# Patient Record
Sex: FEMALE | Race: WHITE | Employment: UNEMPLOYED | URBAN - METROPOLITAN AREA
[De-identification: names, ages, dates, MRNs, and addresses within clinical notes are randomized per-mention and may not be internally consistent; named-entity substitution may affect disease eponyms.]

---

## 2017-02-14 ENCOUNTER — HOSPITAL ENCOUNTER (OUTPATIENT)
Dept: PREADMISSION TESTING | Age: 42
Discharge: HOME | End: 2017-02-14
Payer: COMMERCIAL

## 2017-02-14 VITALS
HEART RATE: 80 BPM | WEIGHT: 141.8 LBS | TEMPERATURE: 99.1 F | HEIGHT: 66 IN | BODY MASS INDEX: 22.79 KG/M2 | RESPIRATION RATE: 14 BRPM | SYSTOLIC BLOOD PRESSURE: 115 MMHG | DIASTOLIC BLOOD PRESSURE: 89 MMHG

## 2017-02-14 DIAGNOSIS — J84.10 LUNG FIBROSIS (HCC): Chronic | ICD-10-CM

## 2017-02-14 DIAGNOSIS — B38.9 COCCIDIOMYCOSIS, PROGRESSIVE: Chronic | ICD-10-CM

## 2017-02-14 DIAGNOSIS — K21.9 GASTROESOPHAGEAL REFLUX DISEASE, ESOPHAGITIS PRESENCE NOT SPECIFIED: Chronic | ICD-10-CM

## 2017-02-14 DIAGNOSIS — I73.00 SECONDARY RAYNAUD'S PHENOMENON: Chronic | ICD-10-CM

## 2017-02-14 DIAGNOSIS — M34.9 SCLERODERMA (HCC): ICD-10-CM

## 2017-02-14 PROBLEM — S63.289A DISLOCATION OF FINGER PIP JOINT: Chronic | Status: ACTIVE | Noted: 2017-02-14

## 2017-02-14 LAB
ANION GAP SERPL CALCULATED.3IONS-SCNC: 11 MEQ/L (ref 7–13)
BUN BLDV-MCNC: 7 MG/DL (ref 6–20)
CALCIUM SERPL-MCNC: 9.1 MG/DL (ref 8.6–10.2)
CHLORIDE BLD-SCNC: 105 MEQ/L (ref 98–107)
CO2: 24 MEQ/L (ref 22–29)
CREAT SERPL-MCNC: 0.75 MG/DL (ref 0.5–0.9)
GFR AFRICAN AMERICAN: >60
GFR NON-AFRICAN AMERICAN: >60
GLUCOSE BLD-MCNC: 81 MG/DL (ref 74–109)
HCT VFR BLD CALC: 39.7 % (ref 37–47)
HEMOGLOBIN: 12.9 G/DL (ref 12–16)
MCH RBC QN AUTO: 28.1 PG (ref 27–31.3)
MCHC RBC AUTO-ENTMCNC: 32.4 % (ref 33–37)
MCV RBC AUTO: 86.8 FL (ref 82–100)
PDW BLD-RTO: 14.2 % (ref 11.5–14.5)
PLATELET # BLD: 139 K/UL (ref 130–400)
POTASSIUM SERPL-SCNC: 4.7 MEQ/L (ref 3.5–5.1)
RBC # BLD: 4.58 M/UL (ref 4.2–5.4)
SODIUM BLD-SCNC: 140 MEQ/L (ref 132–144)
WBC # BLD: 4.9 K/UL (ref 4.8–10.8)

## 2017-02-14 PROCEDURE — 85027 COMPLETE CBC AUTOMATED: CPT

## 2017-02-14 PROCEDURE — 80048 BASIC METABOLIC PNL TOTAL CA: CPT

## 2017-02-14 RX ORDER — SODIUM CHLORIDE 0.9 % (FLUSH) 0.9 %
10 SYRINGE (ML) INJECTION EVERY 12 HOURS SCHEDULED
Status: CANCELLED | OUTPATIENT
Start: 2017-02-14

## 2017-02-14 RX ORDER — SODIUM CHLORIDE, SODIUM LACTATE, POTASSIUM CHLORIDE, CALCIUM CHLORIDE 600; 310; 30; 20 MG/100ML; MG/100ML; MG/100ML; MG/100ML
INJECTION, SOLUTION INTRAVENOUS CONTINUOUS
Status: CANCELLED | OUTPATIENT
Start: 2017-02-14

## 2017-02-14 RX ORDER — LIDOCAINE HYDROCHLORIDE 10 MG/ML
1 INJECTION, SOLUTION EPIDURAL; INFILTRATION; INTRACAUDAL; PERINEURAL
Status: CANCELLED | OUTPATIENT
Start: 2017-02-14 | End: 2017-02-14

## 2017-02-14 RX ORDER — FLUCONAZOLE 200 MG/1
200 TABLET ORAL 2 TIMES DAILY
COMMUNITY

## 2017-02-14 RX ORDER — OMEPRAZOLE 40 MG/1
40 CAPSULE, DELAYED RELEASE ORAL 2 TIMES DAILY
COMMUNITY

## 2017-02-14 RX ORDER — SODIUM CHLORIDE 0.9 % (FLUSH) 0.9 %
10 SYRINGE (ML) INJECTION PRN
Status: CANCELLED | OUTPATIENT
Start: 2017-02-14

## 2017-02-14 RX ORDER — MYCOPHENOLATE MOFETIL 500 MG/1
500 TABLET ORAL 2 TIMES DAILY
COMMUNITY

## 2017-02-14 RX ORDER — HYDROXYCHLOROQUINE SULFATE 200 MG/1
400 TABLET, FILM COATED ORAL DAILY
COMMUNITY
Start: 2008-04-09

## 2017-02-14 ASSESSMENT — ENCOUNTER SYMPTOMS
COUGH: 1
BLURRED VISION: 0
CONSTIPATION: 0
DOUBLE VISION: 0
DIARRHEA: 0
SORE THROAT: 0
VOMITING: 0
WHEEZING: 0
EYE PAIN: 0
EYES NEGATIVE: 1
HEARTBURN: 1
SHORTNESS OF BREATH: 0
NAUSEA: 0

## 2017-02-20 ENCOUNTER — ANESTHESIA (OUTPATIENT)
Dept: OPERATING ROOM | Age: 42
End: 2017-02-20
Payer: COMMERCIAL

## 2017-02-20 ENCOUNTER — HOSPITAL ENCOUNTER (OUTPATIENT)
Age: 42
Setting detail: OUTPATIENT SURGERY
Discharge: HOME | End: 2017-02-20
Attending: ORTHOPAEDIC SURGERY | Admitting: ORTHOPAEDIC SURGERY
Payer: COMMERCIAL

## 2017-02-20 ENCOUNTER — HOSPITAL ENCOUNTER (OUTPATIENT)
Dept: GENERAL RADIOLOGY | Age: 42
Setting detail: OUTPATIENT SURGERY
Discharge: HOME | End: 2017-02-20
Attending: ORTHOPAEDIC SURGERY
Payer: COMMERCIAL

## 2017-02-20 ENCOUNTER — ANESTHESIA EVENT (OUTPATIENT)
Dept: OPERATING ROOM | Age: 42
End: 2017-02-20
Payer: COMMERCIAL

## 2017-02-20 VITALS
HEART RATE: 70 BPM | TEMPERATURE: 97.7 F | OXYGEN SATURATION: 99 % | DIASTOLIC BLOOD PRESSURE: 89 MMHG | SYSTOLIC BLOOD PRESSURE: 112 MMHG | RESPIRATION RATE: 16 BRPM

## 2017-02-20 VITALS — DIASTOLIC BLOOD PRESSURE: 57 MMHG | OXYGEN SATURATION: 100 % | SYSTOLIC BLOOD PRESSURE: 90 MMHG

## 2017-02-20 DIAGNOSIS — M79.642 HAND PAIN, LEFT: ICD-10-CM

## 2017-02-20 PROCEDURE — 7100000000 HC PACU RECOVERY - FIRST 15 MIN: Performed by: ORTHOPAEDIC SURGERY

## 2017-02-20 PROCEDURE — 6360000002 HC RX W HCPCS: Performed by: NURSE ANESTHETIST, CERTIFIED REGISTERED

## 2017-02-20 PROCEDURE — 6360000002 HC RX W HCPCS: Performed by: ORTHOPAEDIC SURGERY

## 2017-02-20 PROCEDURE — 2580000003 HC RX 258: Performed by: STUDENT IN AN ORGANIZED HEALTH CARE EDUCATION/TRAINING PROGRAM

## 2017-02-20 PROCEDURE — 2500000003 HC RX 250 WO HCPCS: Performed by: STUDENT IN AN ORGANIZED HEALTH CARE EDUCATION/TRAINING PROGRAM

## 2017-02-20 PROCEDURE — 2720000010 HC SURG SUPPLY STERILE: Performed by: ORTHOPAEDIC SURGERY

## 2017-02-20 PROCEDURE — C1713 ANCHOR/SCREW BN/BN,TIS/BN: HCPCS | Performed by: ORTHOPAEDIC SURGERY

## 2017-02-20 PROCEDURE — 3700000000 HC ANESTHESIA ATTENDED CARE: Performed by: ORTHOPAEDIC SURGERY

## 2017-02-20 PROCEDURE — 3600000014 HC SURGERY LEVEL 4 ADDTL 15MIN: Performed by: ORTHOPAEDIC SURGERY

## 2017-02-20 PROCEDURE — 7100000010 HC PHASE II RECOVERY - FIRST 15 MIN: Performed by: ORTHOPAEDIC SURGERY

## 2017-02-20 PROCEDURE — 3600000004 HC SURGERY LEVEL 4 BASE: Performed by: ORTHOPAEDIC SURGERY

## 2017-02-20 PROCEDURE — 2500000003 HC RX 250 WO HCPCS: Performed by: ORTHOPAEDIC SURGERY

## 2017-02-20 PROCEDURE — 6360000002 HC RX W HCPCS: Performed by: STUDENT IN AN ORGANIZED HEALTH CARE EDUCATION/TRAINING PROGRAM

## 2017-02-20 PROCEDURE — 7100000011 HC PHASE II RECOVERY - ADDTL 15 MIN: Performed by: ORTHOPAEDIC SURGERY

## 2017-02-20 PROCEDURE — 3700000001 HC ADD 15 MINUTES (ANESTHESIA): Performed by: ORTHOPAEDIC SURGERY

## 2017-02-20 PROCEDURE — 7100000001 HC PACU RECOVERY - ADDTL 15 MIN: Performed by: ORTHOPAEDIC SURGERY

## 2017-02-20 PROCEDURE — 2580000003 HC RX 258: Performed by: ORTHOPAEDIC SURGERY

## 2017-02-20 PROCEDURE — 3209999900 FLUORO FOR SURGICAL PROCEDURES

## 2017-02-20 DEVICE — 18.0MM, MICRO ACUTRAK 2® BONE SCREW
Type: IMPLANTABLE DEVICE | Status: FUNCTIONAL
Brand: ACUMED

## 2017-02-20 RX ORDER — SODIUM CHLORIDE, SODIUM LACTATE, POTASSIUM CHLORIDE, CALCIUM CHLORIDE 600; 310; 30; 20 MG/100ML; MG/100ML; MG/100ML; MG/100ML
INJECTION, SOLUTION INTRAVENOUS CONTINUOUS PRN
Status: DISCONTINUED | OUTPATIENT
Start: 2017-02-20 | End: 2017-02-20 | Stop reason: SDUPTHER

## 2017-02-20 RX ORDER — ONDANSETRON 2 MG/ML
4 INJECTION INTRAMUSCULAR; INTRAVENOUS
Status: DISCONTINUED | OUTPATIENT
Start: 2017-02-20 | End: 2017-02-20 | Stop reason: HOSPADM

## 2017-02-20 RX ORDER — MEPERIDINE HYDROCHLORIDE 25 MG/ML
12.5 INJECTION INTRAMUSCULAR; INTRAVENOUS; SUBCUTANEOUS EVERY 5 MIN PRN
Status: DISCONTINUED | OUTPATIENT
Start: 2017-02-20 | End: 2017-02-20 | Stop reason: HOSPADM

## 2017-02-20 RX ORDER — MIDAZOLAM HYDROCHLORIDE 1 MG/ML
INJECTION INTRAMUSCULAR; INTRAVENOUS PRN
Status: DISCONTINUED | OUTPATIENT
Start: 2017-02-20 | End: 2017-02-20 | Stop reason: SDUPTHER

## 2017-02-20 RX ORDER — MAGNESIUM HYDROXIDE 1200 MG/15ML
LIQUID ORAL CONTINUOUS PRN
Status: DISCONTINUED | OUTPATIENT
Start: 2017-02-20 | End: 2017-02-20 | Stop reason: HOSPADM

## 2017-02-20 RX ORDER — DIPHENHYDRAMINE HYDROCHLORIDE 50 MG/ML
12.5 INJECTION INTRAMUSCULAR; INTRAVENOUS
Status: DISCONTINUED | OUTPATIENT
Start: 2017-02-20 | End: 2017-02-20 | Stop reason: HOSPADM

## 2017-02-20 RX ORDER — HYDROCODONE BITARTRATE AND ACETAMINOPHEN 5; 325 MG/1; MG/1
2 TABLET ORAL PRN
Status: DISCONTINUED | OUTPATIENT
Start: 2017-02-20 | End: 2017-02-20 | Stop reason: HOSPADM

## 2017-02-20 RX ORDER — BUPIVACAINE HYDROCHLORIDE 5 MG/ML
INJECTION, SOLUTION EPIDURAL; INTRACAUDAL PRN
Status: DISCONTINUED | OUTPATIENT
Start: 2017-02-20 | End: 2017-02-20 | Stop reason: HOSPADM

## 2017-02-20 RX ORDER — OXYCODONE HYDROCHLORIDE AND ACETAMINOPHEN 5; 325 MG/1; MG/1
1 TABLET ORAL EVERY 4 HOURS PRN
Qty: 40 TABLET | Refills: 0 | Status: SHIPPED | OUTPATIENT
Start: 2017-02-20 | End: 2017-02-27

## 2017-02-20 RX ORDER — FENTANYL CITRATE 50 UG/ML
50 INJECTION, SOLUTION INTRAMUSCULAR; INTRAVENOUS EVERY 10 MIN PRN
Status: DISCONTINUED | OUTPATIENT
Start: 2017-02-20 | End: 2017-02-20 | Stop reason: HOSPADM

## 2017-02-20 RX ORDER — FENTANYL CITRATE 50 UG/ML
INJECTION, SOLUTION INTRAMUSCULAR; INTRAVENOUS PRN
Status: DISCONTINUED | OUTPATIENT
Start: 2017-02-20 | End: 2017-02-20 | Stop reason: SDUPTHER

## 2017-02-20 RX ORDER — HYDROCODONE BITARTRATE AND ACETAMINOPHEN 5; 325 MG/1; MG/1
1 TABLET ORAL PRN
Status: DISCONTINUED | OUTPATIENT
Start: 2017-02-20 | End: 2017-02-20 | Stop reason: HOSPADM

## 2017-02-20 RX ORDER — METOCLOPRAMIDE HYDROCHLORIDE 5 MG/ML
10 INJECTION INTRAMUSCULAR; INTRAVENOUS
Status: DISCONTINUED | OUTPATIENT
Start: 2017-02-20 | End: 2017-02-20 | Stop reason: HOSPADM

## 2017-02-20 RX ORDER — PROPOFOL 10 MG/ML
INJECTION, EMULSION INTRAVENOUS PRN
Status: DISCONTINUED | OUTPATIENT
Start: 2017-02-20 | End: 2017-02-20 | Stop reason: SDUPTHER

## 2017-02-20 RX ORDER — ONDANSETRON 2 MG/ML
INJECTION INTRAMUSCULAR; INTRAVENOUS PRN
Status: DISCONTINUED | OUTPATIENT
Start: 2017-02-20 | End: 2017-02-20 | Stop reason: SDUPTHER

## 2017-02-20 RX ORDER — SODIUM CHLORIDE, SODIUM LACTATE, POTASSIUM CHLORIDE, CALCIUM CHLORIDE 600; 310; 30; 20 MG/100ML; MG/100ML; MG/100ML; MG/100ML
INJECTION, SOLUTION INTRAVENOUS CONTINUOUS
Status: DISCONTINUED | OUTPATIENT
Start: 2017-02-20 | End: 2017-02-20 | Stop reason: HOSPADM

## 2017-02-20 RX ORDER — LIDOCAINE HYDROCHLORIDE 10 MG/ML
1 INJECTION, SOLUTION EPIDURAL; INFILTRATION; INTRACAUDAL; PERINEURAL
Status: COMPLETED | OUTPATIENT
Start: 2017-02-20 | End: 2017-02-20

## 2017-02-20 RX ADMIN — MIDAZOLAM HYDROCHLORIDE 2 MG: 1 INJECTION, SOLUTION INTRAMUSCULAR; INTRAVENOUS at 12:45

## 2017-02-20 RX ADMIN — LIDOCAINE HYDROCHLORIDE 0.1 ML: 10 INJECTION, SOLUTION EPIDURAL; INFILTRATION; INTRACAUDAL; PERINEURAL at 10:46

## 2017-02-20 RX ADMIN — PROPOFOL 160 MG: 10 INJECTION, EMULSION INTRAVENOUS at 12:55

## 2017-02-20 RX ADMIN — SODIUM CHLORIDE, POTASSIUM CHLORIDE, SODIUM LACTATE AND CALCIUM CHLORIDE: 600; 310; 30; 20 INJECTION, SOLUTION INTRAVENOUS at 10:46

## 2017-02-20 RX ADMIN — FENTANYL CITRATE 50 MCG: 50 INJECTION, SOLUTION INTRAMUSCULAR; INTRAVENOUS at 13:15

## 2017-02-20 RX ADMIN — FENTANYL CITRATE 50 MCG: 50 INJECTION, SOLUTION INTRAMUSCULAR; INTRAVENOUS at 13:35

## 2017-02-20 RX ADMIN — Medication 2 G: at 12:56

## 2017-02-20 RX ADMIN — SODIUM CHLORIDE, POTASSIUM CHLORIDE, SODIUM LACTATE AND CALCIUM CHLORIDE: 600; 310; 30; 20 INJECTION, SOLUTION INTRAVENOUS at 12:42

## 2017-02-20 RX ADMIN — ONDANSETRON 4 MG: 2 INJECTION, SOLUTION INTRAMUSCULAR; INTRAVENOUS at 13:39

## 2017-02-20 ASSESSMENT — PAIN - FUNCTIONAL ASSESSMENT: PAIN_FUNCTIONAL_ASSESSMENT: 0-10

## 2017-02-20 ASSESSMENT — PAIN SCALES - GENERAL: PAINLEVEL_OUTOF10: 0

## 2017-03-09 ENCOUNTER — HOSPITAL ENCOUNTER (OUTPATIENT)
Dept: GENERAL RADIOLOGY | Age: 42
Discharge: HOME | End: 2017-03-09
Payer: COMMERCIAL

## 2017-03-09 DIAGNOSIS — M79.645 PAIN OF FINGER OF LEFT HAND: ICD-10-CM

## 2017-03-09 PROCEDURE — 73140 X-RAY EXAM OF FINGER(S): CPT

## 2024-11-26 ENCOUNTER — APPOINTMENT (OUTPATIENT)
Dept: RHEUMATOLOGY | Facility: CLINIC | Age: 49
End: 2024-11-26
Payer: COMMERCIAL

## 2024-11-26 VITALS
DIASTOLIC BLOOD PRESSURE: 83 MMHG | BODY MASS INDEX: 23.82 KG/M2 | HEIGHT: 65 IN | SYSTOLIC BLOOD PRESSURE: 144 MMHG | HEART RATE: 90 BPM | WEIGHT: 143 LBS

## 2024-11-26 DIAGNOSIS — J84.9 ILD (INTERSTITIAL LUNG DISEASE) (MULTI): ICD-10-CM

## 2024-11-26 DIAGNOSIS — M34.9 SYSTEMIC SCLEROSIS (MULTI): ICD-10-CM

## 2024-11-26 DIAGNOSIS — M35.1 MCTD (MIXED CONNECTIVE TISSUE DISEASE) (MULTI): Primary | ICD-10-CM

## 2024-11-26 DIAGNOSIS — I73.00 RAYNAUD'S PHENOMENON WITHOUT GANGRENE: ICD-10-CM

## 2024-11-26 PROCEDURE — 3008F BODY MASS INDEX DOCD: CPT | Performed by: INTERNAL MEDICINE

## 2024-11-26 PROCEDURE — 1036F TOBACCO NON-USER: CPT | Performed by: INTERNAL MEDICINE

## 2024-11-26 PROCEDURE — 99205 OFFICE O/P NEW HI 60 MIN: CPT | Performed by: INTERNAL MEDICINE

## 2024-11-26 RX ORDER — OMEPRAZOLE 40 MG/1
40 CAPSULE, DELAYED RELEASE ORAL 2 TIMES DAILY
COMMUNITY
Start: 2024-03-13

## 2024-11-26 RX ORDER — MYCOPHENOLATE MOFETIL 500 MG/1
1000 TABLET ORAL 2 TIMES DAILY
Qty: 240 TABLET | Refills: 2 | Status: SHIPPED | OUTPATIENT
Start: 2024-11-26

## 2024-11-26 RX ORDER — BISMUTH SUBSALICYLATE 262 MG
TABLET,CHEWABLE ORAL
COMMUNITY

## 2024-11-26 RX ORDER — SILDENAFIL CITRATE 20 MG/1
20 TABLET ORAL 2 TIMES DAILY
COMMUNITY
Start: 2024-10-02 | End: 2024-11-26 | Stop reason: SDUPTHER

## 2024-11-26 RX ORDER — FERROUS SULFATE 325(65) MG
325 TABLET ORAL 3 TIMES WEEKLY
COMMUNITY
Start: 2024-06-14

## 2024-11-26 RX ORDER — SULFAMETHOXAZOLE AND TRIMETHOPRIM 800; 160 MG/1; MG/1
1 TABLET ORAL 3 TIMES WEEKLY
COMMUNITY
Start: 2018-07-11

## 2024-11-26 RX ORDER — SILDENAFIL CITRATE 20 MG/1
20 TABLET ORAL 3 TIMES DAILY
Qty: 180 TABLET | Refills: 2 | Status: SHIPPED | OUTPATIENT
Start: 2024-11-26

## 2024-11-26 RX ORDER — HYDROXYCHLOROQUINE SULFATE 200 MG/1
300 TABLET, FILM COATED ORAL DAILY
Qty: 90 TABLET | Refills: 1 | Status: SHIPPED | OUTPATIENT
Start: 2024-11-26

## 2024-11-26 RX ORDER — MYCOPHENOLATE MOFETIL 500 MG/1
TABLET ORAL
COMMUNITY
Start: 2013-06-01 | End: 2024-11-26 | Stop reason: SDUPTHER

## 2024-11-26 RX ORDER — BIOTIN 1 MG
1 TABLET ORAL DAILY
COMMUNITY

## 2024-11-26 RX ORDER — DICLOFENAC SODIUM 10 MG/G
GEL TOPICAL
COMMUNITY
Start: 2024-11-05

## 2024-11-26 RX ORDER — HYDROXYCHLOROQUINE SULFATE 200 MG/1
TABLET, FILM COATED ORAL
COMMUNITY
Start: 2017-05-24 | End: 2024-11-26 | Stop reason: SDUPTHER

## 2024-11-26 RX ORDER — AMOXICILLIN 500 MG
CAPSULE ORAL
COMMUNITY

## 2024-11-26 RX ORDER — FLUCONAZOLE 200 MG/1
200 TABLET ORAL 2 TIMES DAILY
COMMUNITY

## 2024-11-26 ASSESSMENT — PAIN SCALES - GENERAL: PAINLEVEL_OUTOF10: 3

## 2024-11-26 NOTE — PROGRESS NOTES
"  49 y.o.        female    self-referred        CHIEF COMPLAINT: Management of MCTD/sclerderma    HPI: Reviewed the old chart from [facility].  Pt is a 33 yo self referred  woman w/ pmh of previously diagnosed mixed connective tissue disease (dx in 2006.  Onset of symptoms in winter of 2005 when she developed Raynaud's. She had cholecystectomy in April 2006 and the following month (May 2006) she developed fevers (max temp 101), rash (chest and arms), arthritis (swelling of hands, knees, ankles)- although she went to ED, no diagnosis or tx given. A month later, her fevers and rash had resolved but she continued to have hand swelling and pain. An KATIE done by her PCP was positive. She was subsequently referred to Dr. Reagan who diagnosed her with MCTD. She was started on prednisone and plaquenil and nifedipine. Methotrexate was later initiated due to continued arthritis. Has only tried aspirin but no other NSAIDs. Has been on chronic prednisone (lowest dose 5 mg daily, has not tried to go lower on dose).   Seen Rheumatology in [place] and in [place]. Last saw Rheumatology in June 2018. \"[place] 2012-13- coccidioides/Valley Fever & therefore;MMF 1000 mg PO BID was held while undergoing fluconazole.  2016 restarted MMF but has never gotten back to full dose.   2016 - June 2019  mg PO TID   2019 Jan - June off Rx due to insurance.     Current sz: Severe Raynaud phenomenon (dusky digits) helped somewhat by amlopdipine only on 5mg. Prior meds gave her migraines/light headed.  2018 - Had echo, chest CT, and PFTs at  per Dr. Yusuf.  2012 [facility]-> \"borderline\" echo findings for PH. The [facility] she was told was normal.   She notes GERD, currently on Prilosec 40mg BID with control \"most of the time\". Still has symptoms 4-5 times per month. She does sleep with HOB elevated.   She was started on Actemra infusion in 2022     At today's visit, the patient reports starting Revatio in 2024 which is helping " her Raynaud's. She is taking Cellcept 1 gm BID and is tolerating it well.  Also, taking Actemra infusions. No morning stiffness. Has pain in knees, and elbows occasionally.   Has no difficulty swallowing but sometimes food gets stuck. Has GERD. Has diarrhea wirh constipation.  Has mild SOB.         Past Medical History:   Diagnosis Date    Chondrocostal junction syndrome (tietze) 11/28/2017    Costochondritis    Other acute postprocedural pain 03/01/2017    Post-operative pain    Other chest pain 11/01/2017    Chest wall tenderness            Past Surgical History:   Procedure Laterality Date    CHOLECYSTECTOMY  02/21/2017    Cholecystectomy    LYMPH NODE BIOPSY  05/24/2017    Biopsy Lymph Node    OTHER SURGICAL HISTORY  05/24/2017    Total Abdominal Hysterectomy With Removal Of Both Tubes    TUBAL LIGATION  02/21/2017    Tubal Ligation       No Known Allergies    Medication Documentation Review Audit       Reviewed by Kareen Costello MA (Medical Assistant) on 11/26/24 at 0906      Medication Order Taking? Sig Documenting Provider Last Dose Status   biotin 1 mg tablet [id number] Yes Take 1 tablet (1 mg) by mouth once daily. Historical Provider, MD  Active   diclofenac sodium (Voltaren) 1 % gel [id number] Yes  Historical Provider, MD  Active   ferrous sulfate, 325 mg ferrous sulfate, tablet [id number] Yes Take 1 tablet (325 mg) by mouth 3 times a week. Historical Provider, MD  Active   fluconazole (Diflucan) 200 mg tablet [id number] Yes Take 1 tablet (200 mg) by mouth 2 times a day. Historical Provider, MD  Active   hydroxychloroquine (Plaquenil) 200 mg tablet [id number] Yes Take 200mg Monday, Wednesday, Friday, Sunday and 400mg Tuesday, Thursday, Saturday, . Historical Provider, MD  Active   L. acidophilus/Bifid. animalis 32 billion cell capsule [id number] Yes Take by mouth. Historical Provider, MD  Active   multivitamin tablet [id number] Yes Take by mouth once daily. Historical Provider, MD  Active   mycophenolate  (Cellcept) 500 mg tablet [id number] Yes  Historical Provider, MD  Active   omega-3 fatty acids-fish oil 300-1,000 mg capsule [id number] Yes Take by mouth once daily. Historical Provider, MD  Active   omeprazole (PriLOSEC) 40 mg DR capsule [id number] Yes Take 1 capsule (40 mg) by mouth twice a day. Historical Provider, MD  Active   sildenafil (Revatio) 20 mg tablet [id number] Yes Take 1 tablet (20 mg) by mouth twice a day. Historical Provider, MD  Active   sulfamethoxazole-trimethoprim (Bactrim DS) 800-160 mg tablet [id number] Yes Take 1 tablet by mouth 3 times a week. Historical Provider, MD  Active   tocilizumab (Acetemra) IV in 50 mL NS [id number] Yes Infuse into a venous catheter. Historical Provider, MD  Active                        No family history on file.       Social History     Tobacco Use    Smoking status: Never     Passive exposure: Never    Smokeless tobacco: Never   Substance Use Topics    Alcohol use: Yes     Comment: 1 nx a year         Review of Systems    REVIEW OF SYSTEMS:  Constitutional: Has fatigue  Skin:  No rash or psoriasis or photosensitvity  Head: No headache, oral ulcers or hair loss  Neck: No difficulty swallowing or choking  Eyes: Has dry eyes but no iritis  Mouth: Has dry mouth but no mouth sores oral ulcers  Pulmonary: No wheezing, pleurisy but SOB  Cardiovascular: Occasional chest pain or palpitations  Gastrointestinal: As H/P  Endocrine:  Raynaud's +  Musculoskeletal: As H/P          PHYSICAL EXAM:  Visit Vitals  /83   Pulse 90     General - NAD, sitting up in chair, well-groomed, pleasant, AAOx3  Head: Normocephalic, atraumatic  Eyes - PERRLA, EOMI. No conjunctiva injection.   Mouth/ENT - Mouth breadth is 3 fingers. Moist oral and nasal mucosa. No facial rash. No enlarged parotid or submandibular gland. Adequate salivary pooling.  Cardiovascular - Normal S1, S2. Regular rate and rhythm. No murmurs or rubs.  Lungs - Basal rales  Skin - No rashes or ulcers. Skin warm and dry. No  erythema on bilateral cheeks.  Abdomen - Soft, non-tender. No masses. Normal bowel sounds.  Extremities - No edema, cyanosis ,or clubbing  Neurological - Alert and oriented x 3,  grossly intact. No focal deficit.  Musculoskeletal -  EXAMJOINTDETAILED,  Shoulders: Full ROM, without pain, no swelling, warmth or tenderness.  Elbows: Full ROM, without pain, no swelling, warmth or tenderness.  Wrists: Full ROM, without pain, no swelling, warmth or tenderness.  MCP: No swelling, warmth or tenderness. Metacarpal squeeze negative  PIP: No swelling, warmth or tenderness.  DIP: No swelling, warmth or tenderness.  Hands : 5/5.    Knees: No swelling.     +ve KATIE, RNP,u1-snrp a, 70kd, chromatin, Sm/RNP,      Progression of pulmonary fibrosis with bilateral lower lobe basilar segmental honeycombing now present.   2. Mild air trapping.  No bronchial wall thickening or airway occlusion.   3. Stable right lower lobe circumscribed pulmonary nodules measuring up to 0.5 x 0.7 cm.  These findings may represent small intrapulmonary lymph nodes.   4. Marked dilatation of the esophagus containing fluid and solid debris consistent with achalasia in association with known scleroderma.  No esophageal mass or esophageal wall thickening.  The patient is at risk for aspiration.   5. No thoracic adenopathy.   VKR/hff       Assessment/plan: 49 yr old with MCTD/scleroderma. She has ILD and last CT scan is showing worsening fibrosis. Will discontinue Actemra and add Nintedanib. Start on 100 mg BID.  Will increase Sildenafil to TID.   Insert SmartText      Use Biotin.     Labs ordered to assess toxicity.           Caitlin Banuelos MD

## 2024-12-03 ENCOUNTER — TELEPHONE (OUTPATIENT)
Dept: RHEUMATOLOGY | Facility: CLINIC | Age: 49
End: 2024-12-03

## 2024-12-03 ENCOUNTER — LAB (OUTPATIENT)
Dept: LAB | Facility: LAB | Age: 49
End: 2024-12-03
Payer: COMMERCIAL

## 2024-12-03 DIAGNOSIS — M34.9 SYSTEMIC SCLEROSIS (MULTI): ICD-10-CM

## 2024-12-03 DIAGNOSIS — J84.9 ILD (INTERSTITIAL LUNG DISEASE) (MULTI): ICD-10-CM

## 2024-12-03 DIAGNOSIS — M35.1 MCTD (MIXED CONNECTIVE TISSUE DISEASE) (MULTI): ICD-10-CM

## 2024-12-03 LAB
ALBUMIN SERPL BCP-MCNC: 4.2 G/DL (ref 3.4–5)
ALP SERPL-CCNC: 36 U/L (ref 33–110)
ALT SERPL W P-5'-P-CCNC: 7 U/L (ref 7–45)
ANION GAP SERPL CALC-SCNC: 10 MMOL/L (ref 10–20)
AST SERPL W P-5'-P-CCNC: 12 U/L (ref 9–39)
BASOPHILS # BLD AUTO: 0.02 X10*3/UL (ref 0–0.1)
BASOPHILS NFR BLD AUTO: 0.3 %
BILIRUB SERPL-MCNC: 0.5 MG/DL (ref 0–1.2)
BUN SERPL-MCNC: 13 MG/DL (ref 6–23)
CALCIUM SERPL-MCNC: 8.8 MG/DL (ref 8.6–10.3)
CHLORIDE SERPL-SCNC: 106 MMOL/L (ref 98–107)
CK SERPL-CCNC: 50 U/L (ref 0–215)
CO2 SERPL-SCNC: 26 MMOL/L (ref 21–32)
CREAT SERPL-MCNC: 0.76 MG/DL (ref 0.5–1.05)
EGFRCR SERPLBLD CKD-EPI 2021: >90 ML/MIN/1.73M*2
EOSINOPHIL # BLD AUTO: 0.06 X10*3/UL (ref 0–0.7)
EOSINOPHIL NFR BLD AUTO: 0.9 %
ERYTHROCYTE [DISTWIDTH] IN BLOOD BY AUTOMATED COUNT: 13.1 % (ref 11.5–14.5)
GLUCOSE SERPL-MCNC: 81 MG/DL (ref 74–99)
HCT VFR BLD AUTO: 43.2 % (ref 36–46)
HGB BLD-MCNC: 13.2 G/DL (ref 12–16)
IMM GRANULOCYTES # BLD AUTO: 0.02 X10*3/UL (ref 0–0.7)
IMM GRANULOCYTES NFR BLD AUTO: 0.3 % (ref 0–0.9)
LYMPHOCYTES # BLD AUTO: 1.97 X10*3/UL (ref 1.2–4.8)
LYMPHOCYTES NFR BLD AUTO: 30.4 %
MCH RBC QN AUTO: 29.1 PG (ref 26–34)
MCHC RBC AUTO-ENTMCNC: 30.6 G/DL (ref 32–36)
MCV RBC AUTO: 95 FL (ref 80–100)
MONOCYTES # BLD AUTO: 0.58 X10*3/UL (ref 0.1–1)
MONOCYTES NFR BLD AUTO: 9 %
NEUTROPHILS # BLD AUTO: 3.82 X10*3/UL (ref 1.2–7.7)
NEUTROPHILS NFR BLD AUTO: 59.1 %
NRBC BLD-RTO: 0 /100 WBCS (ref 0–0)
PLATELET # BLD AUTO: 201 X10*3/UL (ref 150–450)
POTASSIUM SERPL-SCNC: 4.1 MMOL/L (ref 3.5–5.3)
PROT SERPL-MCNC: 6.8 G/DL (ref 6.4–8.2)
RBC # BLD AUTO: 4.54 X10*6/UL (ref 4–5.2)
SODIUM SERPL-SCNC: 138 MMOL/L (ref 136–145)
WBC # BLD AUTO: 6.5 X10*3/UL (ref 4.4–11.3)

## 2024-12-03 PROCEDURE — 82550 ASSAY OF CK (CPK): CPT

## 2024-12-03 PROCEDURE — 85025 COMPLETE CBC W/AUTO DIFF WBC: CPT

## 2024-12-03 PROCEDURE — 36415 COLL VENOUS BLD VENIPUNCTURE: CPT

## 2024-12-03 PROCEDURE — 80053 COMPREHEN METABOLIC PANEL: CPT

## 2024-12-03 PROCEDURE — 84165 PROTEIN E-PHORESIS SERUM: CPT

## 2024-12-03 PROCEDURE — 86160 COMPLEMENT ANTIGEN: CPT

## 2024-12-03 PROCEDURE — 84165 PROTEIN E-PHORESIS SERUM: CPT | Performed by: INTERNAL MEDICINE

## 2024-12-03 PROCEDURE — 86225 DNA ANTIBODY NATIVE: CPT

## 2024-12-03 PROCEDURE — 84155 ASSAY OF PROTEIN SERUM: CPT

## 2024-12-03 NOTE — TELEPHONE ENCOUNTER
Pl[facility] call to [facility] to clarify hydroxychloroquine dose after verifying with Dr. Banuelos. Spoke with Maria Luisa the pharmacy tech, she states that prescription was cancelled prior to me calling back to verify information. Call was transferred to Phong the pharmacist. Verbal order placed with [facility] for the 300 mg tablets. [facility] states that they will send request for reorder when patient is due for refill due to 300 mg tablet not being in our formulary.

## 2024-12-03 NOTE — TELEPHONE ENCOUNTER
Placed call to and spoke to Elena at [facility] regarding a fax received from them to get clarification on hydroxychloroquine tablets that were ordered for patient. Elena states that there is a 300 mg tablet available instead of having the patient cut a tablet in half. Informed Elena that Dr. Banuelos will be made aware of the 300 mg dose tablet available to see if she wants to reorder.

## 2024-12-04 LAB
ALBUMIN: 4.1 G/DL (ref 3.4–5)
ALPHA 1 GLOBULIN: 0.3 G/DL (ref 0.2–0.6)
ALPHA 2 GLOBULIN: 0.7 G/DL (ref 0.4–1.1)
BETA GLOBULIN: 0.9 G/DL (ref 0.5–1.2)
C3 SERPL-MCNC: 114 MG/DL (ref 87–200)
C4 SERPL-MCNC: 24 MG/DL (ref 10–50)
DSDNA AB SER-ACNC: <1 IU/ML
GAMMA GLOBULIN: 1.2 G/DL (ref 0.5–1.4)
PATH REVIEW-SERUM PROTEIN ELECTROPHORESIS: NORMAL
PROT SERPL-MCNC: 7.1 G/DL (ref 6.4–8.2)
PROTEIN ELECTROPHORESIS COMMENT: NORMAL

## 2024-12-04 RX ORDER — NINTEDANIB 100 MG/1
100 CAPSULE ORAL 2 TIMES DAILY
Qty: 60 CAPSULE | Refills: 2 | Status: SHIPPED | OUTPATIENT
Start: 2024-12-04 | End: 2024-12-13 | Stop reason: SDUPTHER

## 2024-12-05 ENCOUNTER — SPECIALTY PHARMACY (OUTPATIENT)
Dept: PHARMACY | Facility: CLINIC | Age: 49
End: 2024-12-05

## 2024-12-09 ENCOUNTER — SPECIALTY PHARMACY (OUTPATIENT)
Dept: PHARMACY | Facility: CLINIC | Age: 49
End: 2024-12-09

## 2024-12-11 ENCOUNTER — SPECIALTY PHARMACY (OUTPATIENT)
Dept: PHARMACY | Facility: CLINIC | Age: 49
End: 2024-12-11

## 2024-12-13 ENCOUNTER — TELEPHONE (OUTPATIENT)
Dept: RHEUMATOLOGY | Facility: CLINIC | Age: 49
End: 2024-12-13
Payer: COMMERCIAL

## 2024-12-13 DIAGNOSIS — J84.9 ILD (INTERSTITIAL LUNG DISEASE) (MULTI): Primary | ICD-10-CM

## 2024-12-13 DIAGNOSIS — M34.9 SYSTEMIC SCLEROSIS (MULTI): ICD-10-CM

## 2024-12-13 DIAGNOSIS — J84.9 ILD (INTERSTITIAL LUNG DISEASE) (MULTI): ICD-10-CM

## 2024-12-13 RX ORDER — NINTEDANIB 100 MG/1
100 CAPSULE ORAL 2 TIMES DAILY
Qty: 60 CAPSULE | Refills: 2 | Status: SHIPPED | OUTPATIENT
Start: 2024-12-13

## 2024-12-13 RX ORDER — NINTEDANIB 100 MG/1
100 CAPSULE ORAL 2 TIMES DAILY
Qty: 60 CAPSULE | Refills: 2 | Status: SHIPPED | OUTPATIENT
Start: 2024-12-13 | End: 2024-12-13 | Stop reason: SDUPTHER

## 2025-01-23 ENCOUNTER — TELEPHONE (OUTPATIENT)
Dept: RHEUMATOLOGY | Facility: CLINIC | Age: 50
End: 2025-01-23
Payer: COMMERCIAL

## 2025-01-23 NOTE — TELEPHONE ENCOUNTER
Placed call to and spoke with Zuleyma, pharmacist at [facility]. Zuleyma made aware that per Dr. Banuelos's 11/26/2024 progress note, the Actemra was discontinued. Zuleyma verbalized understanding.

## 2025-01-23 NOTE — TELEPHONE ENCOUNTER
Zuleyma, pharmacist at [facility], has questions regarding current status of medication- Actemra infusions.    [phone]

## 2025-01-27 ENCOUNTER — APPOINTMENT (OUTPATIENT)
Dept: CARDIOLOGY | Facility: CLINIC | Age: 50
End: 2025-01-27
Payer: COMMERCIAL

## 2025-01-27 VITALS
DIASTOLIC BLOOD PRESSURE: 92 MMHG | HEIGHT: 65 IN | SYSTOLIC BLOOD PRESSURE: 134 MMHG | BODY MASS INDEX: 23.82 KG/M2 | WEIGHT: 143 LBS | HEART RATE: 79 BPM

## 2025-01-27 DIAGNOSIS — R94.31 ABNORMAL EKG: ICD-10-CM

## 2025-01-27 DIAGNOSIS — R07.9 CHEST PAIN, UNSPECIFIED TYPE: ICD-10-CM

## 2025-01-27 DIAGNOSIS — Z82.49 FAMILY HISTORY OF CORONARY ARTERY DISEASE: ICD-10-CM

## 2025-01-27 DIAGNOSIS — R00.2 PALPITATIONS: ICD-10-CM

## 2025-01-27 DIAGNOSIS — J84.10 PULMONARY FIBROSIS (MULTI): Primary | ICD-10-CM

## 2025-01-27 DIAGNOSIS — M35.1 MIXED CONNECTIVE TISSUE DISEASE (MULTI): ICD-10-CM

## 2025-01-27 PROCEDURE — 99204 OFFICE O/P NEW MOD 45 MIN: CPT | Performed by: INTERNAL MEDICINE

## 2025-01-27 PROCEDURE — 93000 ELECTROCARDIOGRAM COMPLETE: CPT | Performed by: INTERNAL MEDICINE

## 2025-01-27 PROCEDURE — 1036F TOBACCO NON-USER: CPT | Performed by: INTERNAL MEDICINE

## 2025-01-27 PROCEDURE — 3008F BODY MASS INDEX DOCD: CPT | Performed by: INTERNAL MEDICINE

## 2025-01-27 NOTE — PROGRESS NOTES
"Referred by  No ref. provider found for Please see below.     History Of Present Illness:    Cyndi Newton is a 49 y.o. female presenting with scleroderma, lupus., chest pain    I am seeing this 49-year-old hyperlipidemic woman with a history of mixed connective tissue disease and scleroderma for evaluation of chest discomfort.    The patient follows with Dr. Banuelos of rheumatology.  She has mixed connective tissue disease and scleroderma with a history of pulmonary fibrosis with interstitial lung disease.  She follows with pulmonary medicine for this.  She is on Nasreen oh.  She had a right heart catheterization in 2012 according to available records which was normal.  A cardiac MRI in May 2022 disclosed an ejection fraction of 55% with no myocardial inflammation, fibrosis, scarring, or shunt.  There was normal right ventricular size and function.  In March 2022 she had a Holter monitor study that disclosed an \"short atrial run\", and no atrial fibrillation.    She report she has had episodic chest discomfort on and off for years., dating back to 2018.  The symptoms occur a couple of times a year.  The pain is in her mid chest where she points.  The pain is not constant, and sometimes is \"almost like a shock feeling\". The usual duration of pain is about 25 minutes.  Most often, this feeling occurs while she is sleeping.  It has awakened her from sleep.  There is no radiation into the neck, jaw, arms, or elsewhere.  She has a baseline level of dyspnea associated with her pulmonary fibrosis, and during episodes of chest pain, her dyspnea is not worse than baseline.  There is no pleuritic component to the symptoms.  She reports episodic palpitations that occur about once a month and do not accompany her chest pain.  The palpitations are brief and resolve on their own.  She exercises twice a week doing a video workout on or her phone.  The patient gardening, yard work etc.    She has a separate, second kind of " pain that is dull and achy and relatively constant.  She has been told this is due to costochondritis.    First: 25 minutes sharper: a couple of weeks ago.  Occurred while sleepping.  Awoke her from sleep  did not med attn.  No rad. No SOB over and above normal (IPF).  Has episodic palpitations that occur once a month, not accompanying pain.        Past Medical History:  She has a past medical history of Chondrocostal junction syndrome (tietze) (11/28/2017), Other acute postprocedural pain (03/01/2017), and Other chest pain (11/01/2017).    Past Surgical History:  She has a past surgical history that includes Tubal ligation (02/21/2017); Cholecystectomy (02/21/2017); Other surgical history (05/24/2017); and Lymph node biopsy (05/24/2017).      Social History:  She reports that she has never smoked. She has never been exposed to tobacco smoke. She has never used smokeless tobacco. She reports current alcohol use. She reports that she does not use drugs.    Family History:  Family History   Problem Relation Name Age of Onset    Other (heart issues) Mother      Heart attack Father          early 60's    Other (CABG) Father      Other (CABG) Paternal Grandmother      Heart attack Paternal Grandfather          diet at age 52 MI.        Allergies:  Patient has no known allergies.    Outpatient Medications:  Current Outpatient Medications   Medication Instructions    biotin 1 mg tablet 1 tablet, Daily    diclofenac sodium (Voltaren) 1 % gel     ferrous sulfate (325 mg ferrous sulfate) 325 mg, 3 times weekly    fluconazole (DIFLUCAN) 200 mg, 2 times daily    hydroxychloroquine (PLAQUENIL) 300 mg, oral, Daily    L. acidophilus/Bifid. animalis 32 billion cell capsule Take by mouth.    multivitamin tablet Daily RT    mycophenolate (CELLCEPT) 1,000 mg, oral, 2 times daily    Ofev 100 mg, oral, 2 times daily    omega-3 fatty acids-fish oil 300-1,000 mg capsule Daily RT    omeprazole (PRILOSEC) 40 mg, 2 times daily    sildenafil  "(REVATIO) 20 mg, oral, 3 times daily    sulfamethoxazole-trimethoprim (Bactrim DS) 800-160 mg tablet 1 tablet, 3 times weekly        Last Recorded Vitals:  Vitals:    01/27/25 0700   BP: (!) 134/92   BP Location: Right arm   Patient Position: Sitting   Pulse: 79   Weight: 64.9 kg (143 lb)   Height: 1.651 m (5' 5\")       Physical Exam:  GENERAL:  pleasant 49 year-old  HEENT: No xanthelasma  NECK: Supple, no palpable adenopathy or thyromegaly  CHEST: Clear to auscultation, respiratory effort unlabored  CARDIAC: RRR, normal S1 and S2, no audible  rub, gallop, carotids are brisk, PMI is not displaced; there is a grade 1 systolic murmur heard best at the LSB.  ABD: Active bowel sounds, nontender, no organomegaly, no evidence of ascites  EXT: No clubbing, cyanosis, edema, or tenderness  NEURO: Awake, alert, appropriate, speech is fluent         Last Labs:  CBC -  Lab Results   Component Value Date    WBC 6.5 12/03/2024    HGB 13.2 12/03/2024    HCT 43.2 12/03/2024    MCV 95 12/03/2024     12/03/2024       CMP -  Lab Results   Component Value Date    CALCIUM 8.8 12/03/2024    PROT 6.8 12/03/2024    PROT 7.1 12/03/2024    ALBUMIN 4.2 12/03/2024    AST 12 12/03/2024    ALT 7 12/03/2024    ALKPHOS 36 12/03/2024    BILITOT 0.5 12/03/2024       LIPID PANEL -   No results found for: \"CHOL\", \"TRIG\", \"HDL\", \"CHHDL\", \"LDLF\", \"VLDL\", \"NHDL\"    RENAL FUNCTION PANEL -   Lab Results   Component Value Date    GLUCOSE 81 12/03/2024     12/03/2024    K 4.1 12/03/2024     12/03/2024    CO2 26 12/03/2024    ANIONGAP 10 12/03/2024    BUN 13 12/03/2024    CREATININE 0.76 12/03/2024    CALCIUM 8.8 12/03/2024    ALBUMIN 4.2 12/03/2024        Lab Results   Component Value Date    BNP 27 05/22/2018    HGBA1C 5.4 11/09/2023         Lab review: I have Chemistry CMP:   Lab Results   Component Value Date    ALBUMIN 4.2 12/03/2024    CALCIUM 8.8 12/03/2024    CO2 26 12/03/2024    CREATININE 0.76 12/03/2024    GLUCOSE 81 12/03/2024 " "   BILITOT 0.5 12/03/2024    PROT 6.8 12/03/2024    PROT 7.1 12/03/2024    ALT 7 12/03/2024    AST 12 12/03/2024    ALKPHOS 36 12/03/2024   , Chemistry BMP   Lab Results   Component Value Date    GLUCOSE 81 12/03/2024    CALCIUM 8.8 12/03/2024    CO2 26 12/03/2024    CREATININE 0.76 12/03/2024   , CBC:  Lab Results   Component Value Date    WBC 6.5 12/03/2024    RBC 4.54 12/03/2024    HGB 13.2 12/03/2024    HCT 43.2 12/03/2024    MCV 95 12/03/2024    MCH 29.1 12/03/2024    MCHC 30.6 (L) 12/03/2024    RDW 13.1 12/03/2024    NRBC 0.0 12/03/2024   , and Lipids: No results found for: \"CHOL\", \"CHLPL\", \"HDL\", \"LDLCALC\", \"TRIG\"  Diagnostic review: I have personally reviewed the result(s) of the Holter Monitor and cardiac MRI.  Please see the respective notes, and HPI for details  Imaging review: I have  personally reviewed the result(s) thoracic CT scan    Assessment/Plan   Assessment & Plan  Chest pain, unspecified type    Orders:    ECG 12 lead (Clinic Performed)    Echocardiogram Stress Test; Future    Follow Up In Cardiology; Future    Pulmonary fibrosis (Multi)         Palpitations    Orders:    Holter Or Event Cardiac Monitor; Future    Follow Up In Cardiology; Future    Abnormal EKG    Orders:    Follow Up In Cardiology; Future    Mixed connective tissue disease (Multi)         Family history of coronary artery disease    Orders:    CT cardiac scoring wo IV contrast; Future      This 49-year-old woman with mixed connective tissue disease, scleroderma, interstitial lung disease with pulmonary fibrosis, and a family history of premature CAD has symptoms of atypical chest pain and palpitations as described.  Her EKG is abnormal reflecting possible old inferior and anterior MIs of indeterminate age.  A prior cardiac MRI in May 2022 disclosed an ejection fraction of 55% with no myocardial fibrosis, inflammation, scarring, or shunting with normal right ventricular size and function.  Because of her history of scleroderma, " pulmonary fibrosis, and mixed connective tissue disease in the context of her symptoms and objective findings, our testing will consist of:  1.  Exercise stress echo  2.  Monitor study  3.  Coronary artery calcium score should be done as an add-on to her next high-resolution CT scan ordered by pulmonary medicine.  She does not need a separate coronary artery calcium score CT separate from any thoracic CT scan ordered by pulmonary.      Zackery Tolentino MD

## 2025-01-27 NOTE — PATIENT INSTRUCTIONS
"It was my pleasure to meet you.  I look forward to being your cardiologist.  I am a huge believer in communicating with my patients.  Please contact me at any time, if anything is not clear to you regarding anything we have discussed, or if new questions occur to you.     You should increase your intake of fresh fruits and vegetables.  Try to consume 9-12 servings per day of such foods.  You should increase your intake of deep sea fish such as salmon and tuna.  Try to get two servings per week of fish, but if you are a pregnant woman, talk to your obstetrician before increasing your fish intake.  You should increase your intake of unprocessed nuts such as walnuts or almonds.  Increase your intake of plant-based protein.  You should avoid fried foods.  Don't consume sugary or starchy foods and sugary drinks.  Avoid saturated fats.  Try not to dine at restaurants more than once per month, and don't dine at fast food places.  Try to get 7-9 hours of sleep every night.  Try to get 150 minutes per week of moderate intensity exercise (after I have cleared you to start an exercise program).  Try to maintain the appropriate weight for your height based on body mass index (BMI). Maintain your cholesterol, blood sugar, and blood pressure in the recommended respective normal ranges.  There is a wealth of information on the American Heart Association's website regarding this.  Just Google \"Life's Essential 8\" for more information.   Ask me about any of these details  if you have questions.    As your cardiologist, I will be available to you at any time to answer any question you have concerning your heart health.  My staff, [facility] can also answer any questions you may have.  Best of luck.       It is important for us to have an accurate list of the medications, supplements, and their doses.  It is also important for us to have an accurate list of your allergies.  Please bring this information to every appointment.  " This is a vital part of the quality of care you receive through all of your providers.

## 2025-01-30 ENCOUNTER — APPOINTMENT (OUTPATIENT)
Dept: PULMONOLOGY | Facility: HOSPITAL | Age: 50
End: 2025-01-30
Payer: COMMERCIAL

## 2025-02-06 ENCOUNTER — APPOINTMENT (OUTPATIENT)
Dept: PULMONOLOGY | Facility: HOSPITAL | Age: 50
End: 2025-02-06
Payer: COMMERCIAL

## 2025-02-12 ENCOUNTER — OFFICE VISIT (OUTPATIENT)
Dept: PULMONOLOGY | Facility: CLINIC | Age: 50
End: 2025-02-12
Payer: COMMERCIAL

## 2025-02-12 VITALS
BODY MASS INDEX: 22.5 KG/M2 | WEIGHT: 140 LBS | HEART RATE: 81 BPM | SYSTOLIC BLOOD PRESSURE: 144 MMHG | HEIGHT: 66 IN | DIASTOLIC BLOOD PRESSURE: 97 MMHG | RESPIRATION RATE: 16 BRPM

## 2025-02-12 DIAGNOSIS — J84.10 POSTINFLAMMATORY PULMONARY FIBROSIS (MULTI): Primary | ICD-10-CM

## 2025-02-12 DIAGNOSIS — M34.9 SCLERODERMA (MULTI): ICD-10-CM

## 2025-02-12 PROCEDURE — 3008F BODY MASS INDEX DOCD: CPT | Performed by: INTERNAL MEDICINE

## 2025-02-12 PROCEDURE — 99204 OFFICE O/P NEW MOD 45 MIN: CPT | Performed by: INTERNAL MEDICINE

## 2025-02-12 PROCEDURE — 99214 OFFICE O/P EST MOD 30 MIN: CPT | Performed by: INTERNAL MEDICINE

## 2025-02-12 NOTE — PATIENT INSTRUCTIONS
Dear Cyndi Newton It was nice seeing you today. We discussed the following:     You have a longstanding history of scleroderma and interstitial lung disease.  You have been on CellCept for many years at 1 g twice daily and will continue and monitoring by rheumatology  You were recently started on Ofev at 100 mg twice a day.  This medication is used to prevent progression of scarring.  It seems to be well-tolerated.  Please have your blood work checked today and if the liver test are normal we will increase the dose to 150 twice a day.    You will need blood work every month for the first 3 months then every 3 months thereafter.  I also ordered a breathing test and a walking test  RTC in 3 months     For scheduling purposes:    Call [phone] to schedule a breathing or a walking test     Call [phone] to schedule  EKG's, Echocardiograms and Cardiopulmonary Stress Tests.    Call [phone] to schedule Radiology tests such as Nuclear Medicine Stress Tests, CT Scans, and MRI's.    Should you have any questions Please Call my assistant Lianna Campo at [phone] or our pulmonary nurse Maria Luisa Valdez [phone].

## 2025-02-12 NOTE — PROGRESS NOTES
Division of Pulmonary, Critical Care, and Sleep Medicine    Reason for the consultation     Cyndi Newton is a 49 y.o. female who presents to a [facility] for an evaluation for scleroderma .  I have independently interviewed and examined the patient in the office and reviewed available records.    Physician HPI (2/12/2025):  Pt was diagnsed with scleroderma in 2009. Started on Cellcept 1g BID then in 2012 she was in [place] and was diagnosed with [place] Fever and was taken off for about 2 yeas then restarted back around 2015   Actemra added in 2022--Nov 2024   Bactrim three times per week   Started OFEV 100 BID in 11/2024 side effects are mild diarrhea   She is transferring her care to    [facility]   Dyspnea Scale:   [facility] - I get short of breath when hurrying on level ground or walking up a slight hill.  She overall feels pretty good and can walk for 3 miles in the summer.  Raynaud bad but sildenafil helps   Joints are pretty good   Reflux bad but controlled on omeprazole and lifestyle modifications   Tight skin around the mouth         PMH  Past Medical History:   Diagnosis Date    Chondrocostal junction syndrome (tietze) 11/28/2017    Costochondritis    Other acute postprocedural pain 03/01/2017    Post-operative pain    Other chest pain 11/01/2017    Chest wall tenderness       Previous pulmonary history:  no history of recurrent infections,. Currently on no supplemental oxygen.  Has never been to pulmonary rehab.     Hospitalization History: Has not been hospitalized over the last year for breathing related problem.    Immunization History:  Immunization History   Administered Date(s) Administered    COVID-19, mRNA, LNP-S, PF, 30 mcg/0.3 mL dose 03/23/2021, 04/13/2021       Family History:  Family History   Problem Relation Name Age of Onset    Other (heart issues) Mother      Heart attack Father          early 60's    Other (CABG) Father      Other (CABG) Paternal  Grandmother      Heart attack Paternal Grandfather          diet at age 52 MI.       Social History:  Tobacco never smoked. No vaping marijuana   Works as:  retired in 2009. [place] at [employer]   TB: No significant exposure, Previous PPD or quantiferon   Asbestos: No History of construction, mining, factory, shipyard, brake or railroad work. Silica: No Hard rock mining, construction, sandblasting, foundry work, ceramic, glass  Coal: No significant exposure to coal mine dust   Organic HP antigen: No chickens, birds, parrots, parakeets, pigeons. No feather bedding. No farming, woodworking, no regular use of a Hot tub or a sauna. No mold or mildew in the house. No Handling vegetable, grain or animal matter   Inorganic HP antigen: No significant exposure       Current Medications:  Current Outpatient Medications   Medication Instructions    biotin 1 mg tablet 1 tablet, Daily    diclofenac sodium (Voltaren) 1 % gel     ferrous sulfate (325 mg ferrous sulfate) 325 mg, 3 times weekly    fluconazole (DIFLUCAN) 200 mg, 2 times daily    hydroxychloroquine (PLAQUENIL) 300 mg, oral, Daily    L. acidophilus/Bifid. animalis 32 billion cell capsule Take by mouth.    multivitamin tablet Daily RT    mycophenolate (CELLCEPT) 1,000 mg, oral, 2 times daily    Ofev 100 mg, oral, 2 times daily    omega-3 fatty acids-fish oil 300-1,000 mg capsule Daily RT    omeprazole (PRILOSEC) 40 mg, 2 times daily    sildenafil (REVATIO) 20 mg, oral, 3 times daily    sulfamethoxazole-trimethoprim (Bactrim DS) 800-160 mg tablet 1 tablet, 3 times weekly        Drug Allergies/Intolerances:  Seasonal allergies     Review of Systems:  All systems have been reviewed and are negative for findings pertinent to the chief complaint except as detailed below or described in the HPI.  Constitutional: Denies symptoms related to weight loss, fever or chills.  ENT: Denies symptoms related to hearing loss, sore throat, sinusitis and  "masses.  Cardiovascular: Denies symptoms related to chest pain, palpitations, edema and orthopnea.  Respiratory: Denies symptoms related to dyspnea, wheezing, cough, hemoptysis or increased sputum.  Gastrointestinal: Denies symptoms related to nausea, vomiting, diarrhea or constipation. GERD   Genitourinary: Denies symptoms related to hematuria, nocturia, frequency or urgency.  Musculoskeletal: Denies symptoms related to arthritis, joint pain, leg pain, weakness or joint stiffness. Raynaud's  Integumentary: Denies symptoms related to skin rashes, moles, pigment changes or ulcers.  Neurological: Denies symptoms related to dizziness, syncope, seizures, tremors or paralysis.  Psychiatric: Denies psychiatric symptoms associated with PTSD, depression, anxiety, psychosis or hallucinations.  Endocrine: Denies endocrine symptoms related to diabetes, polyuria, and cold/heat intolerance.  Hematologic: Denies hematologic symptoms associated with anemia, bruising, bleeding or lymph node tenderness.  Allergic: Denies allergic symptoms associated with allergy to meds, foods or contrast dye.  All other review of systems are negative and/or non-contributory.    Physical Examination:  BP (!) 144/97   Pulse 81   Resp 16   Ht 1.676 m (5' 6\")   Wt 63.5 kg (140 lb)   BMI 22.60 kg/m²  Body mass index is 22.6 kg/m².    General: ambulated independently; no acute distress; well-nourished; work of breathing was not increased; normal vocal character  HEENT: normocephalic; anicteric sclerae; conjunctivae not injected; nasal mucosa was unremarkable; oropharynx was clear without evidence of thrush; dentition was good. Small mouth aperture   Neck: supple; no lymphadenopathy or thyromegaly.  Chest: Crackles 1/3 way up on the left basialr right   Puffy fingers   Cardiac: regular rhythm; no gallop or murmur.  Abdomen: soft; non-tender; non-distended; no hepatosplenomegaly.  Extremities: no leg edema; no digital clubbing; 2+ pulses  Psychiatric: " "did not appear depressed or anxious.  PE:  BP (!) 144/97   Pulse 81   Resp 16   Ht 1.676 m (5' 6\")   Wt 63.5 kg (140 lb)   BMI 22.60 kg/m²       Diagnostic testing       -PFTs I have personally visualized the most recent pulmonary function testing results.        05/2024 FEV1/FVC 75  FVC 2.9  DLCO13      11/20237543SEV6/FVC 75  FVC 2.75 DLCO12.4      07/20225136YXY7/FVC 75  FVC 2.8  DLCO11    4/2/2018 6mwt on room air  spo2 99-96  hr   leona 0-3  actual 590 predicted 485  10/16/2017 6MWT on room air  SpO2 100% - 98%  HR 70 - 113  Leona 0 - 2  actual meters walked 548 - predicted 496     4/2/2018 tiffany FVC = 2.91 L, FEV1 = 2.35 L, DLCO is 11.93 (49%)         6/12/2017  FVC = 2.90 L  FEV1 = 2.39 LSpO2 98-95% on room air,   HR   leona 0-3,   actual 597 meters predicted 496 meters        3/1/2017 Echo  Right Ventricle: The right ventricle is upper limits of normal in size. There is  normal right ventricular global systolic function.  Right Atrium: The right atrium is normal in size.        Date  FEV1/FVC    FEV1 L(%)     FVC L(%)       TLC L(%)    RV/TLC       DLCO c( %)    -Six mn walk: Date    Distance   Pox     -Imaging: not uploaded   -TTE        Eosinophils Absolute   Date Value   12/03/2024 0.06 x10*3/uL   05/22/2018 0.04 x10E9/L   10/16/2017 0.01 x10E9/L       No results found for: \"IGE\"    Assessment and Recommendations:    Ms. Newton is a 49 y.o. female with longstanding history of scleroderma and ILD.  Patient was diagnosed in 2009.  Patient was followed up by rheumatology and pulmonary [facility].  She is currently transferring her care to [facility].  Based on CT reports she has been progressing despite aggressive immunosuppression with CellCept 1 g twice daily and Actemra.  Actemra recently stopped due to lack of efficacy and she was started on Ofev.  She is tolerating the 100 mg twice daily dose.  We will have LFT checked and if everything is okay we will go up to the 150 twice daily " dose.  She needs updating of her pulmonary function test.  I will need to review the chest CTs.  She also have an echocardiography ordered by her cardiology.

## 2025-02-14 LAB
ALBUMIN SERPL-MCNC: 4.2 G/DL (ref 3.6–5.1)
ALBUMIN/GLOB SERPL: 1.5 (CALC) (ref 1–2.5)
ALP SERPL-CCNC: 37 U/L (ref 31–125)
ALT SERPL-CCNC: 12 U/L (ref 6–29)
AST SERPL-CCNC: 16 U/L (ref 10–35)
BILIRUB DIRECT SERPL-MCNC: 0.1 MG/DL
BILIRUB INDIRECT SERPL-MCNC: 0.3 MG/DL (CALC) (ref 0.2–1.2)
BILIRUB SERPL-MCNC: 0.4 MG/DL (ref 0.2–1.2)
GLOBULIN SER CALC-MCNC: 2.8 G/DL (CALC) (ref 1.9–3.7)
PROT SERPL-MCNC: 7 G/DL (ref 6.1–8.1)

## 2025-02-21 ENCOUNTER — APPOINTMENT (OUTPATIENT)
Facility: CLINIC | Age: 50
End: 2025-02-21
Payer: COMMERCIAL

## 2025-02-27 ENCOUNTER — HOSPITAL ENCOUNTER (OUTPATIENT)
Dept: CARDIOLOGY | Facility: HOSPITAL | Age: 50
Discharge: HOME | End: 2025-02-27
Payer: COMMERCIAL

## 2025-02-27 DIAGNOSIS — R00.2 PALPITATIONS: ICD-10-CM

## 2025-02-27 DIAGNOSIS — R07.9 CHEST PAIN, UNSPECIFIED TYPE: ICD-10-CM

## 2025-02-27 PROCEDURE — 93350 STRESS TTE ONLY: CPT | Performed by: INTERNAL MEDICINE

## 2025-02-27 PROCEDURE — 93270 REMOTE 30 DAY ECG REV/REPORT: CPT

## 2025-02-27 PROCEDURE — 93016 CV STRESS TEST SUPVJ ONLY: CPT | Performed by: INTERNAL MEDICINE

## 2025-02-27 PROCEDURE — 93018 CV STRESS TEST I&R ONLY: CPT | Performed by: INTERNAL MEDICINE

## 2025-02-27 PROCEDURE — 93017 CV STRESS TEST TRACING ONLY: CPT

## 2025-03-04 ENCOUNTER — APPOINTMENT (OUTPATIENT)
Dept: RHEUMATOLOGY | Facility: CLINIC | Age: 50
End: 2025-03-04
Payer: COMMERCIAL

## 2025-03-04 VITALS
SYSTOLIC BLOOD PRESSURE: 139 MMHG | HEART RATE: 65 BPM | WEIGHT: 146 LBS | BODY MASS INDEX: 24.32 KG/M2 | DIASTOLIC BLOOD PRESSURE: 81 MMHG | HEIGHT: 65 IN

## 2025-03-04 DIAGNOSIS — M34.9 SCLERODERMA (MULTI): ICD-10-CM

## 2025-03-04 DIAGNOSIS — M35.1 MCTD (MIXED CONNECTIVE TISSUE DISEASE) (MULTI): ICD-10-CM

## 2025-03-04 DIAGNOSIS — Z79.899 ENCOUNTER FOR LONG-TERM (CURRENT) USE OF HIGH-RISK MEDICATION: Primary | ICD-10-CM

## 2025-03-04 PROCEDURE — 1036F TOBACCO NON-USER: CPT | Performed by: INTERNAL MEDICINE

## 2025-03-04 PROCEDURE — 99214 OFFICE O/P EST MOD 30 MIN: CPT | Performed by: INTERNAL MEDICINE

## 2025-03-04 PROCEDURE — 3008F BODY MASS INDEX DOCD: CPT | Performed by: INTERNAL MEDICINE

## 2025-03-04 ASSESSMENT — PAIN SCALES - GENERAL: PAINLEVEL_OUTOF10: 0-NO PAIN

## 2025-03-04 NOTE — PROGRESS NOTES
"  49 y.o.        female    self-referred        CHIEF COMPLAINT: Management of MCTD/sclerderma    HPI: Reviewed the old chart from Care [facility].  Pt is a 33 yo self referred  woman w/ pmh of previously diagnosed mixed connective tissue disease (dx in 2006.  Onset of symptoms in winter of 2005 when she developed Raynaud's. She had cholecystectomy in April 2006 and the following month (May 2006) she developed fevers (max temp 101), rash (chest and arms), arthritis (swelling of hands, knees, ankles)- although she went to ED, no diagnosis or tx given. A month later, her fevers and rash had resolved but she continued to have hand swelling and pain. An KATIE done by her PCP was positive. She was subsequently referred to Dr. Reagan who diagnosed her with MCTD. She was started on prednisone and plaquenil and nifedipine. Methotrexate was later initiated due to continued arthritis. Has only tried aspirin but no other NSAIDs. Has been on chronic prednisone (lowest dose 5 mg daily, has not tried to go lower on dose).   Seen Rheumatology in [place] and in [place]. Last saw Rheumatology in June 2018. \"Valley 2012-13- coccidioides/Valley Fever & therefore;MMF 1000 mg PO BID was held while undergoing fluconazole.  2016 restarted MMF but has never gotten back to full dose.   2016 - June 2019  mg PO TID   2019 Jan - June off Rx due to insurance.     Current sz: Severe Raynaud phenomenon (dusky digits) helped somewhat by amlopdipine only on 5mg. Prior meds gave her migraines/light headed.  2018 - Had echo, chest CT, and PFTs at  per Dr. Yusuf.  2012 [facility]-> \"borderline\" echo findings for PH. The [facility] she was told was normal.   She notes GERD, currently on Prilosec 40mg BID with control \"most of the time\". Still has symptoms 4-5 times per month. She does sleep with HOB elevated.   She was started on Actemra infusion in 2022   Started Revatio in 2024  Nintedanib started in 12/24  Cellcept - 2021    CHIEF " COMPLAINT: Follow up of MCTD/sclerderma    HPI: At today's visit, the patient reports morning stiffness. For 20 minutes. Continues to have pain in knees and shoulders. Has no difficulty swallowing but getting reflux.   Has diarrhea wirh constipation.    Her Raynaud's which is stable.   Tolerating nintedanib with no side effects.        Past Medical History:   Diagnosis Date    Chondrocostal junction syndrome (tietze) 11/28/2017    Costochondritis    Other acute postprocedural pain 03/01/2017    Post-operative pain    Other chest pain 11/01/2017    Chest wall tenderness            Past Surgical History:   Procedure Laterality Date    CHOLECYSTECTOMY  02/21/2017    Cholecystectomy    LYMPH NODE BIOPSY  05/24/2017    Biopsy Lymph Node    OTHER SURGICAL HISTORY  05/24/2017    Total Abdominal Hysterectomy With Removal Of Both Tubes    TUBAL LIGATION  02/21/2017    Tubal Ligation       No Known Allergies    Medication Documentation Review Audit       Reviewed by Kareen Costello MA (Medical Assistant) on 03/04/25 at 0819      Medication Order Taking? Sig Documenting Provider Last Dose Status   biotin 1 mg tablet [id number] Yes Take 1 tablet (1 mg) by mouth once daily. Historical Provider, MD  Active   diclofenac sodium (Voltaren) 1 % gel [id number] Yes  Historical Provider, MD  Active   ferrous sulfate, 325 mg ferrous sulfate, tablet [id number] Yes Take 1 tablet (325 mg) by mouth 3 times a week. Historical Provider, MD  Active   fluconazole (Diflucan) 200 mg tablet [id number]  Take 1 tablet (200 mg) by mouth 2 times a day.   Patient not taking: Reported on 3/4/2025    Historical Provider, MD  Active   hydroxychloroquine (Plaquenil) 200 mg tablet [id number] Yes Take 1.5 tablets (300 mg) by mouth once daily. Caitlin Banuelos MD  Active   L. acidophilus/Bifid. animalis 32 billion cell capsule [id number] Yes Take by mouth. Historical Provider, MD  Active   multivitamin tablet [id number] Yes Take by mouth once daily. Historical  Provider, MD  Active   mycophenolate (Cellcept) 500 mg tablet [id number] Yes Take 2 tablets (1,000 mg) by mouth 2 times a day. Caitlin Banuelos MD  Active   nintedanib (Ofev) 100 mg capsule capsule [id number] Yes Take 1 capsule (100 mg) by mouth 2 times a day. Caitlin Banuelos MD  Active   omega-3 fatty acids-fish oil 300-1,000 mg capsule [id number] Yes Take by mouth once daily. Historical Provider, MD  Active   omeprazole (PriLOSEC) 40 mg DR capsule [id number] Yes Take 1 capsule (40 mg) by mouth twice a day. Historical Provider, MD  Active   sildenafil (Revatio) 20 mg tablet [id number] Yes Take 1 tablet (20 mg) by mouth 3 times a day. Caitlin Banuelos MD  Active   sulfamethoxazole-trimethoprim (Bactrim DS) 800-160 mg tablet [id number] Yes Take 1 tablet by mouth 3 times a week. Historical Provider, MD  Active                        Family History   Problem Relation Name Age of Onset    Other (heart issues) Mother      Heart attack Father          early 60's    Other (CABG) Father      Other (CABG) Paternal Grandmother      Heart attack Paternal Grandfather          diet at age 52 MI.          Social History     Tobacco Use    Smoking status: Never     Passive exposure: Never    Smokeless tobacco: Never   Substance Use Topics    Alcohol use: Yes     Comment: 1 nx a year    Drug use: Never         Review of Systems    REVIEW OF SYSTEMS:  Constitutional: Has fatigue  Skin:  No rash or psoriasis or photosensitvity  Head: No headache, oral ulcers or hair loss  Neck: No difficulty swallowing or choking  Eyes: Has dry eyes but no iritis  Mouth: Has dry mouth but no mouth sores oral ulcers  Pulmonary: No wheezing, pleurisy but SOB. SOB is stable.   Cardiovascular: Occasional chest pain or palpitations  Gastrointestinal: As H/P  Endocrine:  Raynaud's +  Musculoskeletal: As H/P          PHYSICAL EXAM:  Visit Vitals  /81   Pulse 65     General - NAD, sitting up in chair, well-groomed, pleasant, AAOx3  Head: Normocephalic,  atraumatic  Eyes - PERRLA, EOMI. No conjunctiva injection.   Mouth/ENT - Mouth breadth is 3 fingers. Moist oral and nasal mucosa. No facial rash. No enlarged parotid or submandibular gland. Adequate salivary pooling.  Cardiovascular - Normal S1, S2. Regular rate and rhythm. No murmurs or rubs.  Lungs - Basal rales  Skin - No rashes or ulcers. Skin warm and dry. No erythema on bilateral cheeks.  Abdomen - Soft, non-tender. No masses. Normal bowel sounds.  Extremities - No edema, cyanosis ,or clubbing  Neurological - Alert and oriented x 3,  grossly intact. No focal deficit.  Musculoskeletal -  EXAMJOINTDETAILED,  Shoulders: Full ROM, without pain, no swelling, warmth or tenderness.  Elbows: Full ROM, without pain, no swelling, warmth or tenderness.  Wrists: Full ROM, without pain, no swelling, warmth or tenderness.  MCP: No swelling, warmth or tenderness. Metacarpal squeeze negative  PIP: No swelling, warmth or tenderness.  DIP: No swelling, warmth or tenderness.  Hands : 5/5.  Small calcification on left fifth finger  Knees: No swelling.     +ve KATIE, RNP,u1-snrp a, 70kd, chromatin, Sm/RNP,      Progression of pulmonary fibrosis with bilateral lower lobe basilar segmental honeycombing now present.   2. Mild air trapping.  No bronchial wall thickening or airway occlusion.   3. Stable right lower lobe circumscribed pulmonary nodules measuring up to 0.5 x 0.7 cm.  These findings may represent small intrapulmonary lymph nodes.   4. Marked dilatation of the esophagus containing fluid and solid debris consistent with achalasia in association with known scleroderma.  No esophageal mass or esophageal wall thickening.  The patient is at risk for aspiration.   5. No thoracic adenopathy.   VKR/hff         Component      Latest Ref Rng 12/3/2024   nRBC      0.0 - 0.0 /100 WBCs 0.0    RBC      4.00 - 5.20 x10*6/uL 4.54    HEMOGLOBIN      12.0 - 16.0 g/dL 13.2    HEMATOCRIT      36.0 - 46.0 % 43.2    MCV      80 - 100 fL 95     MCH      26.0 - 34.0 pg 29.1    MCHC      32.0 - 36.0 g/dL 30.6 (L)    RED CELL DISTRIBUTION WIDTH      11.5 - 14.5 % 13.1    Platelets      150 - 450 x10*3/uL 201    Neutrophils %      40.0 - 80.0 % 59.1    Immature Granulocytes %, Automated      0.0 - 0.9 % 0.3    Lymphocytes %      13.0 - 44.0 % 30.4    Monocytes %      2.0 - 10.0 % 9.0    Eosinophils %      0.0 - 6.0 % 0.9    Basophils %      0.0 - 2.0 % 0.3    Neutrophils Absolute      1.20 - 7.70 x10*3/uL 3.82    Immature Granulocytes Absolute, Automated      0.00 - 0.70 x10*3/uL 0.02    Lymphocytes Absolute      1.20 - 4.80 x10*3/uL 1.97    Monocytes Absolute      0.10 - 1.00 x10*3/uL 0.58    Eosinophils Absolute      0.00 - 0.70 x10*3/uL 0.06    Basophils Absolute      0.00 - 0.10 x10*3/uL 0.02    GLUCOSE      74 - 99 mg/dL 81    SODIUM      136 - 145 mmol/L 138    POTASSIUM      3.5 - 5.3 mmol/L 4.1    CHLORIDE      98 - 107 mmol/L 106    Bicarbonate      21 - 32 mmol/L 26    Anion Gap      10 - 20 mmol/L 10    Blood Urea Nitrogen      6 - 23 mg/dL 13    Creatinine      0.50 - 1.05 mg/dL 0.76    EGFR      >60 mL/min/1.73m*2 >90    Calcium      8.6 - 10.3 mg/dL 8.8    Albumin      3.4 - 5.0 g/dL 4.2    Alkaline Phosphatase      33 - 110 U/L 36    Total Protein      6.4 - 8.2 g/dL 6.8    Total Protein      6.4 - 8.2 g/dL 7.1    AST      9 - 39 U/L 12    Bilirubin Total      0.0 - 1.2 mg/dL 0.5    ALT      7 - 45 U/L 7    PROTEIN, TOTAL      6.1 - 8.1 g/dL    ALBUMIN      3.6 - 5.1 g/dL    GLOBULIN      1.9 - 3.7 g/dL (calc)    ALBUMIN/GLOBULIN RATIO      1.0 - 2.5 (calc)    BILIRUBIN, TOTAL      0.2 - 1.2 mg/dL    BILIRUBIN, DIRECT      < OR = 0.2 mg/dL    BILIRUBIN, INDIRECT      0.2 - 1.2 mg/dL (calc)    ALKALINE PHOSPHATASE      31 - 125 U/L    AST      10 - 35 U/L    ALT      6 - 29 U/L    Albumin      3.4 - 5.0 g/dL 4.1    Alpha 1 Globulin      0.2 - 0.6 g/dL 0.3    Alpha 2 Globulin      0.4 - 1.1 g/dL 0.7    Beta Globulin      0.5 - 1.2 g/dL 0.9     Gamma      0.5 - 1.4 g/dL 1.2    Protein Electrophoresis Comment Normal.    Path Review - Serum Protein Electrophoresis  Reviewed and approved by LASHELL ELDER on 12/4/24 at 8:46 PM.    Creatine Kinase      0 - 215 U/L 50    C3 Complement      87 - 200 mg/dL 114    C4 Complement      10 - 50 mg/dL 24    Anti-DNA (DS)      <5.0 IU/mL <1.0      Component      Latest Ref Rng 2/13/2025   nRBC      0.0 - 0.0 /100 WBCs    RBC      4.00 - 5.20 x10*6/uL    HEMOGLOBIN      12.0 - 16.0 g/dL    HEMATOCRIT      36.0 - 46.0 %    MCV      80 - 100 fL    MCH      26.0 - 34.0 pg    MCHC      32.0 - 36.0 g/dL    RED CELL DISTRIBUTION WIDTH      11.5 - 14.5 %    Platelets      150 - 450 x10*3/uL    Neutrophils %      40.0 - 80.0 %    Immature Granulocytes %, Automated      0.0 - 0.9 %    Lymphocytes %      13.0 - 44.0 %    Monocytes %      2.0 - 10.0 %    Eosinophils %      0.0 - 6.0 %    Basophils %      0.0 - 2.0 %    Neutrophils Absolute      1.20 - 7.70 x10*3/uL    Immature Granulocytes Absolute, Automated      0.00 - 0.70 x10*3/uL    Lymphocytes Absolute      1.20 - 4.80 x10*3/uL    Monocytes Absolute      0.10 - 1.00 x10*3/uL    Eosinophils Absolute      0.00 - 0.70 x10*3/uL    Basophils Absolute      0.00 - 0.10 x10*3/uL    GLUCOSE      74 - 99 mg/dL    SODIUM      136 - 145 mmol/L    POTASSIUM      3.5 - 5.3 mmol/L    CHLORIDE      98 - 107 mmol/L    Bicarbonate      21 - 32 mmol/L    Anion Gap      10 - 20 mmol/L    Blood Urea Nitrogen      6 - 23 mg/dL    Creatinine      0.50 - 1.05 mg/dL    EGFR      >60 mL/min/1.73m*2    Calcium      8.6 - 10.3 mg/dL    Albumin      3.4 - 5.0 g/dL    Alkaline Phosphatase      33 - 110 U/L    Total Protein      6.4 - 8.2 g/dL    Total Protein      6.4 - 8.2 g/dL    AST      9 - 39 U/L    Bilirubin Total      0.0 - 1.2 mg/dL    ALT      7 - 45 U/L    PROTEIN, TOTAL      6.1 - 8.1 g/dL 7.0    ALBUMIN      3.6 - 5.1 g/dL 4.2    GLOBULIN      1.9 - 3.7 g/dL (calc) 2.8    ALBUMIN/GLOBULIN  RATIO      1.0 - 2.5 (calc) 1.5    BILIRUBIN, TOTAL      0.2 - 1.2 mg/dL 0.4    BILIRUBIN, DIRECT      < OR = 0.2 mg/dL 0.1    BILIRUBIN, INDIRECT      0.2 - 1.2 mg/dL (calc) 0.3    ALKALINE PHOSPHATASE      31 - 125 U/L 37    AST      10 - 35 U/L 16    ALT      6 - 29 U/L 12    Albumin      3.4 - 5.0 g/dL    Alpha 1 Globulin      0.2 - 0.6 g/dL    Alpha 2 Globulin      0.4 - 1.1 g/dL    Beta Globulin      0.5 - 1.2 g/dL    Gamma      0.5 - 1.4 g/dL    Protein Electrophoresis Comment    Path Review - Serum Protein Electrophoresis     Creatine Kinase      0 - 215 U/L    C3 Complement      87 - 200 mg/dL    C4 Complement      10 - 50 mg/dL    Anti-DNA (DS)      <5.0 IU/mL         Assessment/plan: 49 yr old with MCTD/scleroderma. She has ILD and last CT scan is showing worsening fibrosis. She is tolerating nintedanib.   Continue Revatio and Cellcept.     LABS ORDERED.                 Caitlin Banuelos MD

## 2025-03-05 DIAGNOSIS — M34.9 SCLERODERMA (MULTI): Primary | ICD-10-CM

## 2025-03-05 RX ORDER — OMEPRAZOLE 40 MG/1
40 CAPSULE, DELAYED RELEASE ORAL
Qty: 180 CAPSULE | Refills: 1 | Status: SHIPPED | OUTPATIENT
Start: 2025-03-05

## 2025-03-05 NOTE — TELEPHONE ENCOUNTER
Refill request received for prescribed omeprazole to be sent to [facility]. Pended to provider, Dr. Banuelos for authorizing.

## 2025-03-11 DIAGNOSIS — M34.9 SYSTEMIC SCLEROSIS (MULTI): ICD-10-CM

## 2025-03-11 DIAGNOSIS — J84.9 ILD (INTERSTITIAL LUNG DISEASE) (MULTI): ICD-10-CM

## 2025-03-11 RX ORDER — NINTEDANIB 100 MG/1
CAPSULE ORAL
Qty: 60 CAPSULE | Refills: 11 | Status: SHIPPED | OUTPATIENT
Start: 2025-03-11

## 2025-03-14 LAB
ALBUMIN SERPL-MCNC: 4.3 G/DL (ref 3.6–5.1)
ALP SERPL-CCNC: 38 U/L (ref 37–153)
ALT SERPL-CCNC: 8 U/L (ref 6–29)
ANION GAP SERPL CALCULATED.4IONS-SCNC: 11 MMOL/L (CALC) (ref 7–17)
AST SERPL-CCNC: 16 U/L (ref 10–35)
BASOPHILS # BLD AUTO: 19 CELLS/UL (ref 0–200)
BASOPHILS NFR BLD AUTO: 0.3 %
BILIRUB SERPL-MCNC: 0.3 MG/DL (ref 0.2–1.2)
BUN SERPL-MCNC: 11 MG/DL (ref 7–25)
CALCIUM SERPL-MCNC: 9.1 MG/DL (ref 8.6–10.4)
CHLORIDE SERPL-SCNC: 105 MMOL/L (ref 98–110)
CO2 SERPL-SCNC: 24 MMOL/L (ref 20–32)
CREAT SERPL-MCNC: 0.75 MG/DL (ref 0.5–1.03)
CRP SERPL-MCNC: <3 MG/L
EGFRCR SERPLBLD CKD-EPI 2021: 97 ML/MIN/1.73M2
EOSINOPHIL # BLD AUTO: 31 CELLS/UL (ref 15–500)
EOSINOPHIL NFR BLD AUTO: 0.5 %
ERYTHROCYTE [DISTWIDTH] IN BLOOD BY AUTOMATED COUNT: 13.6 % (ref 11–15)
GLUCOSE SERPL-MCNC: 101 MG/DL (ref 65–99)
HCT VFR BLD AUTO: 38.3 % (ref 35–45)
HGB BLD-MCNC: 12.5 G/DL (ref 11.7–15.5)
LYMPHOCYTES # BLD AUTO: 1941 CELLS/UL (ref 850–3900)
LYMPHOCYTES NFR BLD AUTO: 31.3 %
MCH RBC QN AUTO: 29.1 PG (ref 27–33)
MCHC RBC AUTO-ENTMCNC: 32.6 G/DL (ref 32–36)
MCV RBC AUTO: 89.1 FL (ref 80–100)
MONOCYTES # BLD AUTO: 353 CELLS/UL (ref 200–950)
MONOCYTES NFR BLD AUTO: 5.7 %
NEUTROPHILS # BLD AUTO: 3856 CELLS/UL (ref 1500–7800)
NEUTROPHILS NFR BLD AUTO: 62.2 %
PLATELET # BLD AUTO: 171 THOUSAND/UL (ref 140–400)
PMV BLD REES-ECKER: 13.3 FL (ref 7.5–12.5)
POTASSIUM SERPL-SCNC: 4.2 MMOL/L (ref 3.5–5.3)
PROT SERPL-MCNC: 7.1 G/DL (ref 6.1–8.1)
RBC # BLD AUTO: 4.3 MILLION/UL (ref 3.8–5.1)
SODIUM SERPL-SCNC: 140 MMOL/L (ref 135–146)
WBC # BLD AUTO: 6.2 THOUSAND/UL (ref 3.8–10.8)

## 2025-03-17 ENCOUNTER — HOSPITAL ENCOUNTER (OUTPATIENT)
Dept: RESPIRATORY THERAPY | Facility: HOSPITAL | Age: 50
Discharge: HOME | End: 2025-03-17
Payer: COMMERCIAL

## 2025-03-17 DIAGNOSIS — J84.10 POSTINFLAMMATORY PULMONARY FIBROSIS (MULTI): ICD-10-CM

## 2025-03-17 DIAGNOSIS — M34.9 SCLERODERMA (MULTI): ICD-10-CM

## 2025-03-17 PROCEDURE — 94010 BREATHING CAPACITY TEST: CPT

## 2025-03-17 PROCEDURE — 94729 DIFFUSING CAPACITY: CPT | Performed by: INTERNAL MEDICINE

## 2025-03-17 PROCEDURE — 94618 PULMONARY STRESS TESTING: CPT

## 2025-03-17 PROCEDURE — 94010 BREATHING CAPACITY TEST: CPT | Performed by: INTERNAL MEDICINE

## 2025-03-18 LAB
MGC ASCENT PFT - FEV1 - PRE: 2.3
MGC ASCENT PFT - FEV1 - PRE: 2.3
MGC ASCENT PFT - FEV1 - PREDICTED: 2.73
MGC ASCENT PFT - FEV1 - PREDICTED: 2.73
MGC ASCENT PFT - FVC - PRE: 3.02
MGC ASCENT PFT - FVC - PRE: 3.02
MGC ASCENT PFT - FVC - PREDICTED: 3.36
MGC ASCENT PFT - FVC - PREDICTED: 3.36

## 2025-03-24 ENCOUNTER — APPOINTMENT (OUTPATIENT)
Dept: CARDIOLOGY | Facility: CLINIC | Age: 50
End: 2025-03-24
Payer: COMMERCIAL

## 2025-03-27 ENCOUNTER — APPOINTMENT (OUTPATIENT)
Dept: PULMONOLOGY | Facility: HOSPITAL | Age: 50
End: 2025-03-27
Payer: COMMERCIAL

## 2025-04-08 ENCOUNTER — APPOINTMENT (OUTPATIENT)
Dept: CARDIOLOGY | Facility: CLINIC | Age: 50
End: 2025-04-08
Payer: COMMERCIAL

## 2025-04-08 VITALS
HEIGHT: 65 IN | HEART RATE: 68 BPM | BODY MASS INDEX: 23.99 KG/M2 | WEIGHT: 144 LBS | SYSTOLIC BLOOD PRESSURE: 138 MMHG | DIASTOLIC BLOOD PRESSURE: 78 MMHG

## 2025-04-08 DIAGNOSIS — I47.29 NONSUSTAINED VENTRICULAR TACHYCARDIA (MULTI): ICD-10-CM

## 2025-04-08 DIAGNOSIS — J84.10 PULMONARY FIBROSIS (MULTI): ICD-10-CM

## 2025-04-08 DIAGNOSIS — I47.10 SVT (SUPRAVENTRICULAR TACHYCARDIA) (CMS-HCC): ICD-10-CM

## 2025-04-08 DIAGNOSIS — M35.1 MIXED CONNECTIVE TISSUE DISEASE (MULTI): ICD-10-CM

## 2025-04-08 DIAGNOSIS — R00.2 PALPITATIONS: Primary | ICD-10-CM

## 2025-04-08 DIAGNOSIS — R07.89 ATYPICAL CHEST PAIN: ICD-10-CM

## 2025-04-08 DIAGNOSIS — M34.9 SCLERODERMA (MULTI): ICD-10-CM

## 2025-04-08 PROCEDURE — 3008F BODY MASS INDEX DOCD: CPT | Performed by: INTERNAL MEDICINE

## 2025-04-08 PROCEDURE — 1036F TOBACCO NON-USER: CPT | Performed by: INTERNAL MEDICINE

## 2025-04-08 PROCEDURE — 99214 OFFICE O/P EST MOD 30 MIN: CPT | Performed by: INTERNAL MEDICINE

## 2025-04-08 RX ORDER — METOPROLOL SUCCINATE 25 MG/1
12.5 TABLET, EXTENDED RELEASE ORAL DAILY
Qty: 45 TABLET | Refills: 3 | Status: SHIPPED | OUTPATIENT
Start: 2025-04-08 | End: 2026-04-08

## 2025-04-08 NOTE — PATIENT INSTRUCTIONS

## 2025-04-08 NOTE — PROGRESS NOTES
"Chief Complaint:   Please see below.     History Of Present Illness:    Cyndi Newton is a 50 y.o. female presenting with scleroderma, lupus., chest pain .    The patient follows with Dr. Banuelos of rheumatology.  She has mixed connective tissue disease and scleroderma with a history of pulmonary fibrosis with interstitial lung disease with pulmonary fibrosis.  She follows with pulmonary medicine for this.  She is on Sildenafil.  She had a right heart catheterization in 2012 according to available records which was normal.  A cardiac MRI in May 2022 disclosed an ejection fraction of 55% with no myocardial inflammation, fibrosis, scarring, or shunt.  There was normal right ventricular size and function.  In March 2022 she had a Holter monitor study that disclosed an \"short atrial run\", and no atrial fibrillation.  The patient's stress test on 2/27/2025 disclosed no ischemia at 10.1 METS.  The patient's monitor study on 2/27-3/28/2025 .y done  disclosed two short runs of SVT and 2 short runs of nonsustained VT .  The patient's coronary artery calcium score is ordered, and will be done when she has her next thoracic CT scan with her pulmonologist in June 2025.        Overall she feels well.  She experiences palpitations every few days.  This feels like a \"sick feeling in my stomach, and it feels funny, and sometimes my heart races\".    She has two coffees per day, and rarely consumes  alcohol.          Last Recorded Vitals:  Vitals:    04/08/25 0900   BP: 138/78   BP Location: Left arm   Patient Position: Sitting   Pulse: 68   Weight: 65.3 kg (144 lb)   Height: 1.651 m (5' 5\")       Past Medical History:  She has a past medical history of Anemia, Arrhythmia (2022), Chondrocostal junction syndrome (tietze) (11/28/2017), GERD (gastroesophageal reflux disease) (2009), Interstitial lung disease (Multi), Lung nodule, Other acute postprocedural pain (03/01/2017), and Other chest pain (11/01/2017).    Past Surgical " History:  She has a past surgical history that includes Tubal ligation (02/21/2017); Cholecystectomy (02/21/2017); Other surgical history (05/24/2017); Lymph node biopsy (05/24/2017); and Hysterectomy (2017).      Social History:  She reports that she has never smoked. She has never been exposed to tobacco smoke. She has never used smokeless tobacco. She reports current alcohol use. She reports that she does not use drugs.    Family History:  Family History   Problem Relation Name Age of Onset    Other (heart issues) Mother Luisa Anne     Atrial fibrillation Mother Luisa Anne     Cancer Mother Luisa Anne     Diabetes Mother Luisa Anne     Arrhythmia Mother Luisa Anne     Lung disease Mother Luisa Anne     Autoimmune disease Mother Luisa Anne     Diabetes type II Mother Luisa Anne     Thyroid disease Mother Luisa Anne     Arthritis Mother Luisa Anne     Asthma Mother Luisa Anne     COPD Mother Luisa Anne     Heart disease Mother Luisa Anne     Hypertension Mother Luisa Anne     Heart attack Father Sixto Anne Sr         early 60's    Other (CABG) Father Sixto Anne Sr     Heart disease Father Sixto Anne Sr     Hypertension Father Sixto Anne Sr     Other (CABG) Paternal Grandmother Rocio Anne     Cancer Paternal Grandmother Rocio Anne     Heart disease Paternal Grandmother Rocio Anne     Heart attack Paternal Grandmother Rocio Anne     Heart attack Paternal Grandfather Mike Anne         diet at age 52 MI.    Heart disease Paternal Grandfather Mike Anne     Diabetes Maternal Grandmother Nirmala Alex     Heart disease Maternal Grandmother Nirmala Alex     Diabetes type I Maternal Grandmother Nirmala Johnson     Cancer Father's Sister Cassie Camejo     Diabetes type I Brother Sixto Dayana     Diabetes Mother's Brother Meir Johnson     Diabetes type I Mother's Brother Meir Johnson     Cancer Father's Sister Cassie Camejo     Diabetes Mother's Brother Meir Johnson  "    Diabetes Brother Sixto Anne     Vision loss Brother Sixto Anne         Allergies:  Patient has no known allergies.    Outpatient Medications:  Current Outpatient Medications   Medication Instructions    biotin 1 mg tablet 1 tablet, Daily    diclofenac sodium (Voltaren) 1 % gel     ferrous sulfate 325 mg, 3 times weekly    fluconazole (DIFLUCAN) 200 mg, 2 times daily    hydroxychloroquine (Sovuna) 300 mg tablet tablet 1 tablet, Daily    L. acidophilus/Bifid. animalis 32 billion cell capsule Take by mouth.    multivitamin tablet Daily RT    mycophenolate (CELLCEPT) 1,000 mg, oral, 2 times daily    Ofev 100 mg capsule capsule TAKE 1 CAPSULE 2 TIMES A DAY (12 HOURS APART) WITH FOOD    omega-3 fatty acids-fish oil 300-1,000 mg capsule Daily RT    omeprazole (PRILOSEC) 40 mg, oral, 2 times daily before meals    sildenafil (REVATIO) 20 mg, oral, 3 times daily    sulfamethoxazole-trimethoprim (Bactrim DS) 800-160 mg tablet 1 tablet, 3 times weekly       Physical Exam:  GENERAL:  pleasant 50 year-old  HEENT: No xanthelasma  NECK: Supple, no palpable adenopathy or thyromegaly  CHEST: Bibasilar dry rales, respiratory effort unlabored  CARDIAC: RRR, normal S1 and S2, no audible murmur, rub, gallop, carotids are brisk, PMI is not displaced  ABD: Active bowel sounds, nontender, no organomegaly, no evidence of ascites  EXT: No clubbing, cyanosis, edema, or tenderness  NEURO: Awake, alert, appropriate, speech is fluent       Last Labs:  CBC -  Lab Results   Component Value Date    WBC 6.2 03/13/2025    HGB 12.5 03/13/2025    HCT 38.3 03/13/2025    MCV 89.1 03/13/2025     03/13/2025       CMP -  Lab Results   Component Value Date    CALCIUM 9.1 03/13/2025    PROT 7.1 03/13/2025    ALBUMIN 4.3 03/13/2025    AST 16 03/13/2025    ALT 8 03/13/2025    ALKPHOS 38 03/13/2025    BILITOT 0.3 03/13/2025       LIPID PANEL -   No results found for: \"CHOL\", \"TRIG\", \"HDL\", \"CHHDL\", \"LDLF\", \"VLDL\", \"NHDL\"    RENAL FUNCTION PANEL - "   Lab Results   Component Value Date    GLUCOSE 101 (H) 03/13/2025     03/13/2025    K 4.2 03/13/2025     03/13/2025    CO2 24 03/13/2025    ANIONGAP 11 03/13/2025    BUN 11 03/13/2025    CREATININE 0.75 03/13/2025    CALCIUM 9.1 03/13/2025    ALBUMIN 4.3 03/13/2025        Lab Results   Component Value Date    BNP 27 05/22/2018    HGBA1C 5.4 11/09/2023         Diagnostic review: I have independently interpreted the Telemetry and  stress echo.  My findings are as summarized in the report(s).  .    Assessment/Plan   Assessment & Plan  Palpitations         Atypical chest pain         Pulmonary fibrosis (Multi)    Orders:    Follow Up In Cardiology; Future    SVT (supraventricular tachycardia) (CMS-HCC)    Orders:    metoprolol succinate XL (Toprol-XL) 25 mg 24 hr tablet; Take 0.5 tablets (12.5 mg) by mouth once daily. Do not crush or chew.    Follow Up In Cardiology; Future    Scleroderma (Multi)    Orders:    Follow Up In Cardiology; Future    Mixed connective tissue disease (Multi)    Orders:    Follow Up In Cardiology; Future    Nonsustained ventricular tachycardia (Multi)               Zackery Tolentino MD

## 2025-04-09 NOTE — ASSESSMENT & PLAN NOTE
Orders:    metoprolol succinate XL (Toprol-XL) 25 mg 24 hr tablet; Take 0.5 tablets (12.5 mg) by mouth once daily. Do not crush or chew.    Follow Up In Cardiology; Future

## 2025-04-22 ENCOUNTER — APPOINTMENT (OUTPATIENT)
Dept: PRIMARY CARE | Facility: CLINIC | Age: 50
End: 2025-04-22
Payer: COMMERCIAL

## 2025-05-16 ENCOUNTER — TELEPHONE (OUTPATIENT)
Dept: PRIMARY CARE | Facility: CLINIC | Age: 50
End: 2025-05-16
Payer: COMMERCIAL

## 2025-05-16 DIAGNOSIS — Z79.899 LONG-TERM USE OF PLAQUENIL: Primary | ICD-10-CM

## 2025-05-16 DIAGNOSIS — M35.1 MCTD (MIXED CONNECTIVE TISSUE DISEASE) (MULTI): Primary | ICD-10-CM

## 2025-05-16 RX ORDER — HYDROXYCHLOROQUINE SULFATE 300 MG/1
TABLET ORAL DAILY
Qty: 90 TABLET | Refills: 3 | Status: SHIPPED | OUTPATIENT
Start: 2025-05-16

## 2025-06-04 ENCOUNTER — OFFICE VISIT (OUTPATIENT)
Dept: PULMONOLOGY | Facility: CLINIC | Age: 50
End: 2025-06-04
Payer: COMMERCIAL

## 2025-06-04 VITALS
DIASTOLIC BLOOD PRESSURE: 83 MMHG | WEIGHT: 138 LBS | SYSTOLIC BLOOD PRESSURE: 127 MMHG | HEART RATE: 66 BPM | RESPIRATION RATE: 16 BRPM | BODY MASS INDEX: 22.99 KG/M2 | HEIGHT: 65 IN

## 2025-06-04 DIAGNOSIS — M34.9 SCLERODERMA (MULTI): ICD-10-CM

## 2025-06-04 DIAGNOSIS — D84.9 IMMUNOCOMPROMISED PATIENT: Primary | ICD-10-CM

## 2025-06-04 DIAGNOSIS — J84.10 POSTINFLAMMATORY PULMONARY FIBROSIS (MULTI): ICD-10-CM

## 2025-06-04 PROCEDURE — 3008F BODY MASS INDEX DOCD: CPT | Performed by: INTERNAL MEDICINE

## 2025-06-04 PROCEDURE — 1036F TOBACCO NON-USER: CPT | Performed by: INTERNAL MEDICINE

## 2025-06-04 PROCEDURE — 99215 OFFICE O/P EST HI 40 MIN: CPT | Performed by: INTERNAL MEDICINE

## 2025-06-04 RX ORDER — NINTEDANIB 150 MG/1
150 CAPSULE ORAL EVERY 12 HOURS
Qty: 60 CAPSULE | Refills: 2 | Status: SHIPPED | OUTPATIENT
Start: 2025-06-04 | End: 2025-06-04 | Stop reason: RX

## 2025-06-04 RX ORDER — NINTEDANIB 150 MG/1
150 CAPSULE ORAL EVERY 12 HOURS
Qty: 60 CAPSULE | Refills: 2 | Status: SHIPPED | OUTPATIENT
Start: 2025-06-04

## 2025-06-04 NOTE — PATIENT INSTRUCTIONS
Dear Cyndi Newton It was nice seeing you today. We discussed the following:     You have a longstanding history of scleroderma and interstitial lung disease.  You have been on CellCept for many years at 1 g twice daily and will continue and monitoring by rheumatology  You were recently started on Ofev at 100 mg twice a day. Well tolerated and we will increase to 150 mg twice daily  Blood work in 1 month and every month for the first 3 months and then every 3 months thereafter  Most recent breathing test and walking test are stable  I will get back with you regarding continued fluconazole prophylaxis   RTC in 6  months with breathing test and a chest CT    For scheduling purposes:    Call [phone] to schedule a breathing or a walking test     Call [phone] to schedule  EKG's, Echocardiograms and Cardiopulmonary Stress Tests.    Call [phone] to schedule Radiology tests such as Nuclear Medicine Stress Tests, CT Scans, and MRI's.    Should you have any questions Please Call my assistant Lianna Campo at [phone] or our pulmonary nurse Maria Luisa Valdez [phone].

## 2025-06-04 NOTE — PROGRESS NOTES
"Follow up visit Scleroderma ILD     Interval 6/4/2025  She is doing well.  She is going to the gym 3 times per week.  She walks for 25 minutes and then does weight.  She stopped the sildenafil because of chest pain but she noticed that her Raynaud's got a lot worse.  She saw cardiology and they performed a stress echo which was negative they also performed a Holter monitor results are detailed below and they put her on metoprolol.  She was supposed to take half of the 25 mg tablet but that caused her heart rate to go to the low 40s.  She has subsequently been taken a quarter of the 25 mg pill.  She denies any fever chills weight loss loss of appetite.  She is tolerating the Ofev 100 twice a day well with no significant side effects      PE:   /83   Pulse 66   Resp 16   Ht 1.651 m (5' 5\")   Wt 62.6 kg (138 lb)   BMI 22.96 kg/m²   Hands are cyanotic and cold   Minimal crackles at the bases      Reason for the consultation     Cyndi Newton is a 49 y.o. female who presents to a [facility] for an evaluation for scleroderma .  I have independently interviewed and examined the patient in the office and reviewed available records.    Physician HPI (2/12/2025):  Pt was diagnsed with scleroderma in 2009. Started on Cellcept 1g BID then in 2012 she was in [place] and was diagnosed with [place] Fever and was taken off for about 2 yeas then restarted back around 2015.  Actemra added in 2022--Nov 2024   Bactrim three times per week   Started OFEV 100 BID in 11/2024 side effects are mild diarrhea   She is transferring her care to    [facility]   Dyspnea Scale:   [facility] 1 - I get short of breath when hurrying on level ground or walking up a slight hill.  She overall feels pretty good and can walk for 3 miles in the summer.  Raynaud bad but sildenafil helps   Joints are pretty good   Reflux bad but controlled on omeprazole and lifestyle modifications   Tight skin around the mouth   "       PMH  Past Medical History:   Diagnosis Date    Chondrocostal junction syndrome (tietze) 11/28/2017    Costochondritis    Other acute postprocedural pain 03/01/2017    Post-operative pain    Other chest pain 11/01/2017    Chest wall tenderness       Previous pulmonary history:  no history of recurrent infections,. Currently on no supplemental oxygen.  Has never been to pulmonary rehab.     Hospitalization History: Has not been hospitalized over the last year for breathing related problem.    Immunization History:  Immunization History   Administered Date(s) Administered    COVID-19, mRNA, LNP-S, PF, 30 mcg/0.3 mL dose 03/23/2021, 04/13/2021       Family History:  Family History   Problem Relation Name Age of Onset    Other (heart issues) Mother      Heart attack Father          early 60's    Other (CABG) Father      Other (CABG) Paternal Grandmother      Heart attack Paternal Grandfather          diet at age 52 MI.       Social History:  Tobacco never smoked. No vaping marijuana   Works as:  retired in 2009.  at [employer]   TB: No significant exposure, Previous PPD or quantiferon   Asbestos: No History of construction, mining, factory, shipyard, brake or railroad work. Silica: No Hard rock mining, construction, sandblasting, foundry work, ceramic, glass  Coal: No significant exposure to coal mine dust   Organic HP antigen: No chickens, birds, parrots, parakeets, pigeons. No feather bedding. No farming, woodworking, no regular use of a Hot tub or a sauna. No mold or mildew in the house. No Handling vegetable, grain or animal matter   Inorganic HP antigen: No significant exposure       Current Medications:  Current Outpatient Medications   Medication Instructions    biotin 1 mg tablet 1 tablet, Daily    diclofenac sodium (Voltaren) 1 % gel     ferrous sulfate (325 mg ferrous sulfate) 325 mg, 3 times weekly    fluconazole (DIFLUCAN) 200 mg, 2 times daily    hydroxychloroquine (PLAQUENIL) 300  "mg, oral, Daily    L. acidophilus/Bifid. animalis 32 billion cell capsule Take by mouth.    multivitamin tablet Daily RT    mycophenolate (CELLCEPT) 1,000 mg, oral, 2 times daily    Ofev 100 mg, oral, 2 times daily    omega-3 fatty acids-fish oil 300-1,000 mg capsule Daily RT    omeprazole (PRILOSEC) 40 mg, 2 times daily    sildenafil (REVATIO) 20 mg, oral, 3 times daily    sulfamethoxazole-trimethoprim (Bactrim DS) 800-160 mg tablet 1 tablet, 3 times weekly        Drug Allergies/Intolerances:  Seasonal allergies     Diagnostic testing       -PFTs I have personally visualized the most recent pulmonary function testing results.      05/2025 FVC 3.0 DLCO 13.3       05/2024 FEV1/FVC 75  FVC 2.9  DLCO13      11/20237218JFN2/FVC 75  FVC 2.75 DLCO12.4      07/20226317UIO9/FVC 75  FVC 2.8  DLCO11  4/2/2018 spiro FVC = 2.91 L, FEV1 = 2.35 L, DLCO is 11.93 (49%)   2017 FVC = 2.90 L FEV1 = 2.39 LSpO2 98-95% on room a      03/2025 walked for 561 m (97%) 97--94% RA    4/2/2018 6mwt on room air  spo2 99-96  hr   leona 0-3  actual 590 predicted 485      10/16/2017 6MWT on room air  SpO2 100% - 98%  HR 70 - 113  Leona 0 - 2  actual meters walked 548 - predicted 496      6/12/2017  FVC = 2.90 L  FEV1 = 2.39 LSpO2 98-95% on room air,   HR   leona 0-3,   actual 597 meters predicted 496 meters        3/1/2017 Echo  Right Ventricle: The right ventricle is upper limits of normal in size. There is  normal right ventricular global systolic function.  Right Atrium: The right atrium is normal in size.      -TTE 2025       Eosinophils Absolute   Date Value   12/03/2024 0.06 x10*3/uL   05/22/2018 0.04 x10E9/L   10/16/2017 0.01 x10E9/L       No results found for: \"IGE\"    Assessment and Recommendations:    Ms. Newton is a 50 y.o. female with   1.longstanding history of scleroderma and ILD.  Patient was diagnosed in 2009.  Patient was followed up by rheumatology and pulmonary [facility].  She is currently transferring her care to [place] [facility] hospitals.  Based on CT reports she has been progressing despite aggressive immunosuppression with CellCept 1 g twice daily and Actemra.  Actemra recently stopped due to lack of efficacy and she was started on Ofev.  She is tolerating the 100 mg twice daily dose.  LFT are wnl, we will go up to the 150 twice daily dose.  Most recent pulmonary function test 03/2025 is stable compared to prior.      -Increase ofev to 150 BID   -Continue to exercise   -Blood work monthly for the first 3 months on the 150 of 05 and then every 3 months thereafter  -Return to clinic in 6 months with testing and the chest CT    2.  History of coccidiomycosis in 2012 treated with fluconazole.  She was off the CellCept for 2 years then.  She is on life long prophylaxis with fluconazole with 200 mg per day

## 2025-06-11 ENCOUNTER — APPOINTMENT (OUTPATIENT)
Dept: PRIMARY CARE | Facility: CLINIC | Age: 50
End: 2025-06-11
Payer: COMMERCIAL

## 2025-06-11 VITALS
DIASTOLIC BLOOD PRESSURE: 80 MMHG | HEIGHT: 65 IN | BODY MASS INDEX: 22.96 KG/M2 | TEMPERATURE: 97.6 F | SYSTOLIC BLOOD PRESSURE: 124 MMHG | WEIGHT: 137.8 LBS | RESPIRATION RATE: 16 BRPM

## 2025-06-11 DIAGNOSIS — E55.9 VITAMIN D DEFICIENCY: ICD-10-CM

## 2025-06-11 DIAGNOSIS — M35.1 CONNECTIVE TISSUE DISEASE OVERLAP SYNDROME (MULTI): ICD-10-CM

## 2025-06-11 DIAGNOSIS — K22.0 ACHALASIA: ICD-10-CM

## 2025-06-11 DIAGNOSIS — E78.2 MIXED HYPERLIPIDEMIA: ICD-10-CM

## 2025-06-11 DIAGNOSIS — J84.10 PULMONARY FIBROSIS (MULTI): ICD-10-CM

## 2025-06-11 DIAGNOSIS — M35.9 INTERSTITIAL LUNG DISEASE DUE TO CONNECTIVE TISSUE DISEASE (MULTI): ICD-10-CM

## 2025-06-11 DIAGNOSIS — J84.89 INTERSTITIAL LUNG DISEASE DUE TO CONNECTIVE TISSUE DISEASE (MULTI): ICD-10-CM

## 2025-06-11 DIAGNOSIS — K22.719 BARRETT'S ESOPHAGUS WITH DYSPLASIA: ICD-10-CM

## 2025-06-11 DIAGNOSIS — Z00.00 WELLNESS EXAMINATION: Primary | ICD-10-CM

## 2025-06-11 DIAGNOSIS — M34.9 SCLERODERMA (MULTI): ICD-10-CM

## 2025-06-11 DIAGNOSIS — M06.9 RHEUMATOID ARTHRITIS, INVOLVING UNSPECIFIED SITE, UNSPECIFIED WHETHER RHEUMATOID FACTOR PRESENT (MULTI): Chronic | ICD-10-CM

## 2025-06-11 PROBLEM — K21.9 GASTROESOPHAGEAL REFLUX DISEASE: Status: ACTIVE | Noted: 2018-05-22

## 2025-06-11 PROBLEM — K22.70 BARRETT'S ESOPHAGUS WITHOUT DYSPLASIA: Status: ACTIVE | Noted: 2022-08-29

## 2025-06-11 PROBLEM — S82.141D CLOSED DISPLACED BICONDYLAR FRACTURE OF RIGHT TIBIA WITH ROUTINE HEALING: Status: ACTIVE | Noted: 2023-05-18

## 2025-06-11 PROBLEM — I73.00 RAYNAUD'S DISEASE: Status: ACTIVE | Noted: 2021-01-26

## 2025-06-11 PROBLEM — R07.89 ATYPICAL CHEST PAIN: Status: RESOLVED | Noted: 2025-04-08 | Resolved: 2025-06-11

## 2025-06-11 PROCEDURE — 99386 PREV VISIT NEW AGE 40-64: CPT | Performed by: INTERNAL MEDICINE

## 2025-06-11 PROCEDURE — 1036F TOBACCO NON-USER: CPT | Performed by: INTERNAL MEDICINE

## 2025-06-11 PROCEDURE — 3008F BODY MASS INDEX DOCD: CPT | Performed by: INTERNAL MEDICINE

## 2025-06-11 ASSESSMENT — PATIENT HEALTH QUESTIONNAIRE - PHQ9
2. FEELING DOWN, DEPRESSED OR HOPELESS: NOT AT ALL
1. LITTLE INTEREST OR PLEASURE IN DOING THINGS: NOT AT ALL
SUM OF ALL RESPONSES TO PHQ9 QUESTIONS 1 AND 2: 0

## 2025-06-11 NOTE — PROGRESS NOTES
"Subjective   Patient ID: Cyndi Newton \"Aleshia\" is a 50 y.o. female who presents for Annual Exam and Establish Care.  Patient presents for her annual exam.      Last A1C: 2023  Results: 5.4%  Mammogram: 2024  Echo: 2025  EC2025      Well Adult Physical   Patient here for a comprehensive physical exam.The patient reports no problems    Do you take any herbs or supplements that were not prescribed by a doctor? no   Are you taking calcium supplements? no   Are you taking aspirin daily? no     History:  LMP: hysterectomy  Menopause at n/a years  Last pap date: 2024  Abnormal pap? no  : didn't ask  Para: didn't ask        Systemic scleroisis. She has pulmonary  fibrosis, esophageal spasms and narrowing.   Raynauds  Was dx   She has joint pain. Was worked up for RA because her markers are positive at time  She is sob at times. She is not on oxygen  She has had egd   Colonoscopy was done in   was negative but she has family hx of colon cancer and she is every 5 years  She has been  three years.  Lives in [place]  Has three kids and one grandchild    She does not get vaccines.    Review of Systems   All other systems reviewed and are negative.      Objective   Physical Exam  Constitutional:       Appearance: Normal appearance.   HENT:      Mouth/Throat:      Mouth: Mucous membranes are moist.   Neck:      Thyroid: No thyroid mass or thyromegaly.   Cardiovascular:      Rate and Rhythm: Normal rate and regular rhythm.      Pulses: Normal pulses.   Pulmonary:      Effort: Pulmonary effort is normal.      Breath sounds: Normal breath sounds.   Chest:      Chest wall: No mass or tenderness.   Breasts:     Right: Normal.      Left: Normal.   Abdominal:      General: Abdomen is flat.      Palpations: Abdomen is soft.      Tenderness: There is no abdominal tenderness.   Musculoskeletal:      Cervical back: Neck supple. No tenderness.   Lymphadenopathy:      Cervical: " No cervical adenopathy.      Upper Body:      Right upper body: No supraclavicular or axillary adenopathy.      Left upper body: No supraclavicular or axillary adenopathy.   Skin:     General: Skin is warm.   Neurological:      General: No focal deficit present.      Mental Status: She is alert.   Psychiatric:         Attention and Perception: Attention and perception normal.         Mood and Affect: Mood and affect normal.         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    Interstitial lung disease due to connective tissue disease (Multi) J84.89, M35.9    Scleroderma (Multi) M34.9    Relevant Orders    Referral to Gastroenterology    Rheumatoid arthritis (Chronic) M06.9    Achalasia K22.0    Relevant Orders    Referral to Gastroenterology     Other Visit Diagnoses         Codes      Wellness examination    -  Primary Z00.00    Relevant Orders    Lipid Panel      Rodriguez's esophagus with dysplasia     K22.719    Relevant Orders    Referral to Gastroenterology      Vitamin D deficiency     E55.9    Relevant Orders    Vitamin D 25-Hydroxy,Total (for eval of Vitamin D levels)      Mixed hyperlipidemia     E78.2    Relevant Orders    Lipid Panel          Check labs.   Refer to new gastro.   Declines vaccines.   Utd on cancer screenings.  Call  with any problems or questions.   Follow up yearly        Susie Jacques DO 06/11/25 1:10 PM

## 2025-06-24 ENCOUNTER — APPOINTMENT (OUTPATIENT)
Dept: GASTROENTEROLOGY | Facility: CLINIC | Age: 50
End: 2025-06-24
Payer: COMMERCIAL

## 2025-06-24 ENCOUNTER — TELEPHONE (OUTPATIENT)
Dept: PULMONOLOGY | Facility: HOSPITAL | Age: 50
End: 2025-06-24

## 2025-06-24 VITALS
WEIGHT: 132 LBS | HEART RATE: 60 BPM | TEMPERATURE: 97.7 F | SYSTOLIC BLOOD PRESSURE: 140 MMHG | RESPIRATION RATE: 16 BRPM | BODY MASS INDEX: 21.99 KG/M2 | HEIGHT: 65 IN | DIASTOLIC BLOOD PRESSURE: 90 MMHG

## 2025-06-24 DIAGNOSIS — Z12.11 COLON CANCER SCREENING: ICD-10-CM

## 2025-06-24 DIAGNOSIS — K22.0 ACHALASIA: ICD-10-CM

## 2025-06-24 DIAGNOSIS — M34.9 SCLERODERMA (MULTI): ICD-10-CM

## 2025-06-24 DIAGNOSIS — K22.719 BARRETT'S ESOPHAGUS WITH DYSPLASIA: Primary | ICD-10-CM

## 2025-06-24 PROCEDURE — 1036F TOBACCO NON-USER: CPT | Performed by: INTERNAL MEDICINE

## 2025-06-24 PROCEDURE — 3008F BODY MASS INDEX DOCD: CPT | Performed by: INTERNAL MEDICINE

## 2025-06-24 PROCEDURE — 99204 OFFICE O/P NEW MOD 45 MIN: CPT | Performed by: INTERNAL MEDICINE

## 2025-06-24 ASSESSMENT — ENCOUNTER SYMPTOMS
UNEXPECTED WEIGHT CHANGE: 0
SHORTNESS OF BREATH: 0

## 2025-06-24 NOTE — TELEPHONE ENCOUNTER
Prior authorization for Ofev was requested. PA was submitted and was approved. Approval #[id number] and valid until 6/24/2026. Pt was updated via [employer] message.

## 2025-06-24 NOTE — PROGRESS NOTES
"Subjective   Patient ID: Cyndi Newton \"Aleshia\" is a 50 y.o. female who presents for Barretts? Followed by GI in [facility]  History of Present Illness  The patient presents for evaluation of Rodriguez's esophagus and colon cancer screening.    She has been diagnosed with Rodriguez's esophagus and is currently on a regimen of omeprazole, taken twice daily, to manage acid reflux and prevent aspiration due to her scleroderma. This treatment appears to be effective However, she does experience occasional heartburn, which she attributes to dietary indiscretions such as consuming citric acid or spicy foods. Her daily caffeine intake is approximately 2 cups, and she is making efforts to reduce this to 1 cup. She has noticed that her acid reflux symptoms seem to worsen during prolonged periods without food intake. She reports no chest pain or shortness of breath. She occasionally experiences difficulty swallowing, with food sometimes getting stuck in her throat.    She has undergone repeat colonoscopy because her brother had colon cancer at age 52. Her last colonoscopy was in 2023,due in 2028    SOCIAL HISTORY  Coffee/Tea/Caffeine-containing Drinks: Drinks about 2 cups of coffee a day and is trying to cut it to one cup.    FAMILY HISTORY  - Brother: Colon cancer, diagnosed at age 52    Med list notable for    Labs notable for    No fhx of CRC.       Prev endoscopic eval: colonoscopy report in [facility] 11/23    Review of Systems   Constitutional:  Negative for unexpected weight change.   Respiratory:  Negative for shortness of breath.    Cardiovascular:  Negative for chest pain.       Objective     /90   Pulse 60   Temp 36.5 °C (97.7 °F)   Resp 16   Ht 1.651 m (5' 5\")   Wt 59.9 kg (132 lb)   BMI 21.97 kg/m²      Physical Exam  Constitutional:       Comments: Awake   HENT:      Head: Normocephalic.   Cardiovascular:      Rate and Rhythm: Normal rate and regular rhythm.   Pulmonary:      Effort: Pulmonary effort " is normal.      Breath sounds: Normal breath sounds.   Abdominal:      General: Bowel sounds are normal.      Palpations: Abdomen is soft.   Neurological:      Mental Status: She is alert.   Psychiatric:         Mood and Affect: Mood normal.            Assessment & Plan  1. Rodriguez's esophagus:  - Upper endoscopy path  report from 2022 showed no evidence of Rodriguez's esophagus.  - Cont Omeprazole twice a day to prevent acid reflux and aspiration related to scleroderma.  - Occasional breakthrough heartburn due to dietary indiscretions.  - Reduce caffeine intake to one 8-ounce cup of coffee per day; consider switching to decaffeinated coffee for the second cup.  - Discussed potential long-term risks of omeprazole, - Monitor symptoms closely to ensure they do not worsen.  - If symptoms remain well-controlled, schedule repeat endoscopy in 2028, along with the next colonoscopy.    2. Colon cancer screening:  - Increased genetic risk due to brother's history of colon cancer at age 52.  - Last colonoscopy in 2023 showed no polyps.  - Repeat colonoscopy recommended every 5 years; next one due in 2028.  - Monitor for changes in bowel habits, presence of blood in the stool, black tarry stools, or persistent abdominal pain.  - If any of these symptoms occur, an earlier colonoscopy may be necessary.    Follow-up: 2028.    Jacinta Saucedo MD     This medical note was created with the assistance of artificial intelligence (AI) for documentation purposes. The content has been reviewed and confirmed by the healthcare provider for accuracy and completeness. Patient consented to the use of audio recording and use of AI during their visit.

## 2025-08-06 LAB
25(OH)D3+25(OH)D2 SERPL-MCNC: 43 NG/ML (ref 30–100)
ALBUMIN SERPL-MCNC: 4.2 G/DL (ref 3.6–5.1)
ALBUMIN/GLOB SERPL: 1.7 (CALC) (ref 1–2.5)
ALP SERPL-CCNC: 29 U/L (ref 37–153)
ALT SERPL-CCNC: 11 U/L (ref 6–29)
AST SERPL-CCNC: 18 U/L (ref 10–35)
BILIRUB DIRECT SERPL-MCNC: 0.1 MG/DL
BILIRUB INDIRECT SERPL-MCNC: 0.3 MG/DL (CALC) (ref 0.2–1.2)
BILIRUB SERPL-MCNC: 0.4 MG/DL (ref 0.2–1.2)
CHOLEST SERPL-MCNC: 174 MG/DL
CHOLEST/HDLC SERPL: 3.3 (CALC)
GLOBULIN SER CALC-MCNC: 2.5 G/DL (CALC) (ref 1.9–3.7)
HDLC SERPL-MCNC: 53 MG/DL
LDLC SERPL CALC-MCNC: 102 MG/DL (CALC)
NONHDLC SERPL-MCNC: 121 MG/DL (CALC)
PROT SERPL-MCNC: 6.7 G/DL (ref 6.1–8.1)
TRIGL SERPL-MCNC: 99 MG/DL

## 2025-08-29 ENCOUNTER — TELEPHONE (OUTPATIENT)
Dept: PULMONOLOGY | Facility: HOSPITAL | Age: 50
End: 2025-08-29
Payer: COMMERCIAL

## 2025-08-29 DIAGNOSIS — M34.9 SCLERODERMA (MULTI): ICD-10-CM

## 2025-08-29 DIAGNOSIS — J84.10 POSTINFLAMMATORY PULMONARY FIBROSIS (MULTI): ICD-10-CM

## 2025-08-29 RX ORDER — OMEPRAZOLE 40 MG/1
CAPSULE, DELAYED RELEASE ORAL
Qty: 180 CAPSULE | Refills: 3 | Status: SHIPPED | OUTPATIENT
Start: 2025-08-29

## 2025-09-30 ENCOUNTER — APPOINTMENT (OUTPATIENT)
Dept: RHEUMATOLOGY | Facility: CLINIC | Age: 50
End: 2025-09-30
Payer: COMMERCIAL

## 2025-10-14 ENCOUNTER — APPOINTMENT (OUTPATIENT)
Dept: CARDIOLOGY | Facility: CLINIC | Age: 50
End: 2025-10-14
Payer: COMMERCIAL

## 2025-11-11 ENCOUNTER — APPOINTMENT (OUTPATIENT)
Dept: CARDIOLOGY | Facility: CLINIC | Age: 50
End: 2025-11-11
Payer: COMMERCIAL

## 2026-06-15 ENCOUNTER — APPOINTMENT (OUTPATIENT)
Dept: PRIMARY CARE | Facility: CLINIC | Age: 51
End: 2026-06-15
Payer: COMMERCIAL

## (undated) DEVICE — GLOVE ORANGE PI 7   MSG9070

## (undated) DEVICE — GOWN,AURORA,NONREINFORCED,LARGE: Brand: MEDLINE

## (undated) DEVICE — Z DUP USE 2107167 BIT DRL MIC LNG EXT NS ACUTRK 2

## (undated) DEVICE — SUTURE TICRON 4-0 BLU 18 IN P-13 NDL SCD3059G

## (undated) DEVICE — DRAPE CARM MINI FOR IMAG SYS INSIGHT FLROSCN

## (undated) DEVICE — HAND II: Brand: MEDLINE INDUSTRIES, INC.

## (undated) DEVICE — 1010 S-DRAPE TOWEL DRAPE 10/BX: Brand: STERI-DRAPE™

## (undated) DEVICE — .035 X 6 IN DOUBLE TROCAR GUIDE WIRE: Brand: ACUMED

## (undated) DEVICE — PENCIL ES L3M BTTN SWCH HOLSTER W/ BLDE ELECTRD EDGE

## (undated) DEVICE — SYRINGE BLB 50CC IRRIG PLIABLE FNGR FLNG GRAD FLSK DISP

## (undated) DEVICE — CHLORAPREP 26ML ORANGE

## (undated) DEVICE — GLOVE ORANGE PI 8   MSG9080

## (undated) DEVICE — ELECTRODE PT RET AD L9FT HI MOIST COND ADH HYDRGEL CORDED

## (undated) DEVICE — PAD SHLDR BLUE WHITE FELT FOR USE W ARM SLNG PROCARE

## (undated) DEVICE — MICRODISSECTION NEEDLE STRAIGHT SLEEVE: Brand: COLORADO

## (undated) DEVICE — SUTURE ETHLN SZ 4-0 L18IN NONABSORBABLE BLK L19MM PS-2 3/8 1667H

## (undated) DEVICE — GLOVE ORANGE PI 7 1/2   MSG9075

## (undated) DEVICE — GAUZE SPONGES,12 PLY: Brand: CURITY

## (undated) DEVICE — GLOVE SURG SZ 65 THK91MIL LTX FREE SYN POLYISOPRENE

## (undated) DEVICE — MEDI-VAC NON-CONDUCTIVE SUCTION TUBING: Brand: CARDINAL HEALTH

## (undated) DEVICE — SUTURE MCRYL + SZ 4-0 L27IN ABSRB UD L19MM PS-2 3/8 CIR MCP426H

## (undated) DEVICE — 2000CC GUARDIAN II: Brand: GUARDIAN